# Patient Record
Sex: FEMALE | Race: BLACK OR AFRICAN AMERICAN | NOT HISPANIC OR LATINO | URBAN - METROPOLITAN AREA
[De-identification: names, ages, dates, MRNs, and addresses within clinical notes are randomized per-mention and may not be internally consistent; named-entity substitution may affect disease eponyms.]

---

## 2018-02-14 ENCOUNTER — EMERGENCY (EMERGENCY)
Facility: HOSPITAL | Age: 53
LOS: 0 days | Discharge: HOME | End: 2018-02-14
Attending: EMERGENCY MEDICINE
Payer: SELF-PAY

## 2018-02-14 VITALS
HEART RATE: 63 BPM | RESPIRATION RATE: 15 BRPM | DIASTOLIC BLOOD PRESSURE: 72 MMHG | SYSTOLIC BLOOD PRESSURE: 117 MMHG | OXYGEN SATURATION: 100 % | TEMPERATURE: 98 F | WEIGHT: 220.02 LBS | HEIGHT: 61 IN

## 2018-02-14 DIAGNOSIS — R10.33 PERIUMBILICAL PAIN: ICD-10-CM

## 2018-02-14 DIAGNOSIS — R11.2 NAUSEA WITH VOMITING, UNSPECIFIED: ICD-10-CM

## 2018-02-14 DIAGNOSIS — J45.909 UNSPECIFIED ASTHMA, UNCOMPLICATED: ICD-10-CM

## 2018-02-14 DIAGNOSIS — Z98.891 HISTORY OF UTERINE SCAR FROM PREVIOUS SURGERY: Chronic | ICD-10-CM

## 2018-02-14 LAB
ALBUMIN SERPL ELPH-MCNC: 3.8 G/DL — SIGNIFICANT CHANGE UP (ref 3.3–5)
ALP SERPL-CCNC: 107 U/L — SIGNIFICANT CHANGE UP (ref 40–120)
ALT FLD-CCNC: 17 U/L — SIGNIFICANT CHANGE UP (ref 12–78)
AMYLASE P1 CFR SERPL: 57 U/L — SIGNIFICANT CHANGE UP (ref 25–115)
ANION GAP SERPL CALC-SCNC: 7 MMOL/L — SIGNIFICANT CHANGE UP (ref 5–17)
APTT BLD: 36.1 SEC — SIGNIFICANT CHANGE UP (ref 27.5–37.4)
AST SERPL-CCNC: 11 U/L — LOW (ref 15–37)
BASOPHILS # BLD AUTO: 0.03 K/UL — SIGNIFICANT CHANGE UP (ref 0–0.2)
BASOPHILS NFR BLD AUTO: 0.2 % — SIGNIFICANT CHANGE UP (ref 0–2)
BILIRUB SERPL-MCNC: 0.3 MG/DL — SIGNIFICANT CHANGE UP (ref 0.2–1.2)
BUN SERPL-MCNC: 16 MG/DL — SIGNIFICANT CHANGE UP (ref 7–23)
CALCIUM SERPL-MCNC: 8.6 MG/DL — SIGNIFICANT CHANGE UP (ref 8.5–10.1)
CHLORIDE SERPL-SCNC: 103 MMOL/L — SIGNIFICANT CHANGE UP (ref 96–108)
CO2 SERPL-SCNC: 28 MMOL/L — SIGNIFICANT CHANGE UP (ref 22–31)
CREAT SERPL-MCNC: 0.65 MG/DL — SIGNIFICANT CHANGE UP (ref 0.5–1.3)
EOSINOPHIL # BLD AUTO: 0 K/UL — SIGNIFICANT CHANGE UP (ref 0–0.5)
EOSINOPHIL NFR BLD AUTO: 0 % — SIGNIFICANT CHANGE UP (ref 0–6)
GLUCOSE SERPL-MCNC: 122 MG/DL — HIGH (ref 70–99)
HCT VFR BLD CALC: 39.4 % — SIGNIFICANT CHANGE UP (ref 34.5–45)
HGB BLD-MCNC: 13.4 G/DL — SIGNIFICANT CHANGE UP (ref 11.5–15.5)
IMM GRANULOCYTES NFR BLD AUTO: 0.4 % — SIGNIFICANT CHANGE UP (ref 0–1.5)
INR BLD: 1.14 RATIO — SIGNIFICANT CHANGE UP (ref 0.88–1.16)
LACTATE SERPL-SCNC: 1.3 MMOL/L — SIGNIFICANT CHANGE UP (ref 0.7–2)
LIDOCAIN IGE QN: 72 U/L — LOW (ref 73–393)
LYMPHOCYTES # BLD AUTO: 1.01 K/UL — SIGNIFICANT CHANGE UP (ref 1–3.3)
LYMPHOCYTES # BLD AUTO: 7.6 % — LOW (ref 13–44)
MCHC RBC-ENTMCNC: 29.1 PG — SIGNIFICANT CHANGE UP (ref 27–34)
MCHC RBC-ENTMCNC: 34 GM/DL — SIGNIFICANT CHANGE UP (ref 32–36)
MCV RBC AUTO: 85.7 FL — SIGNIFICANT CHANGE UP (ref 80–100)
MONOCYTES # BLD AUTO: 0.24 K/UL — SIGNIFICANT CHANGE UP (ref 0–0.9)
MONOCYTES NFR BLD AUTO: 1.8 % — LOW (ref 2–14)
NEUTROPHILS # BLD AUTO: 11.92 K/UL — HIGH (ref 1.8–7.4)
NEUTROPHILS NFR BLD AUTO: 90 % — HIGH (ref 43–77)
NRBC # BLD: 0 /100 WBCS — SIGNIFICANT CHANGE UP (ref 0–0)
PLATELET # BLD AUTO: 237 K/UL — SIGNIFICANT CHANGE UP (ref 150–400)
POTASSIUM SERPL-MCNC: 3.7 MMOL/L — SIGNIFICANT CHANGE UP (ref 3.5–5.3)
POTASSIUM SERPL-SCNC: 3.7 MMOL/L — SIGNIFICANT CHANGE UP (ref 3.5–5.3)
PROT SERPL-MCNC: 8.4 GM/DL — HIGH (ref 6–8.3)
PROTHROM AB SERPL-ACNC: 12.5 SEC — SIGNIFICANT CHANGE UP (ref 9.8–12.7)
RBC # BLD: 4.6 M/UL — SIGNIFICANT CHANGE UP (ref 3.8–5.2)
RBC # FLD: 12.6 % — SIGNIFICANT CHANGE UP (ref 10.3–14.5)
SODIUM SERPL-SCNC: 138 MMOL/L — SIGNIFICANT CHANGE UP (ref 135–145)
WBC # BLD: 13.25 K/UL — HIGH (ref 3.8–10.5)
WBC # FLD AUTO: 13.25 K/UL — HIGH (ref 3.8–10.5)

## 2018-02-14 PROCEDURE — 93010 ELECTROCARDIOGRAM REPORT: CPT

## 2018-02-14 PROCEDURE — 74176 CT ABD & PELVIS W/O CONTRAST: CPT | Mod: 26

## 2018-02-14 PROCEDURE — 71045 X-RAY EXAM CHEST 1 VIEW: CPT | Mod: 26

## 2018-02-14 PROCEDURE — 99285 EMERGENCY DEPT VISIT HI MDM: CPT

## 2018-02-14 RX ORDER — IOHEXOL 300 MG/ML
30 INJECTION, SOLUTION INTRAVENOUS ONCE
Qty: 0 | Refills: 0 | Status: COMPLETED | OUTPATIENT
Start: 2018-02-14 | End: 2018-02-14

## 2018-02-14 RX ORDER — PANTOPRAZOLE SODIUM 20 MG/1
40 TABLET, DELAYED RELEASE ORAL ONCE
Qty: 0 | Refills: 0 | Status: COMPLETED | OUTPATIENT
Start: 2018-02-14 | End: 2018-02-14

## 2018-02-14 RX ORDER — ONDANSETRON 8 MG/1
4 TABLET, FILM COATED ORAL ONCE
Qty: 0 | Refills: 0 | Status: COMPLETED | OUTPATIENT
Start: 2018-02-14 | End: 2018-02-14

## 2018-02-14 RX ORDER — FAMOTIDINE 10 MG/ML
1 INJECTION INTRAVENOUS
Qty: 7 | Refills: 0
Start: 2018-02-14 | End: 2018-02-20

## 2018-02-14 RX ORDER — KETOROLAC TROMETHAMINE 30 MG/ML
30 SYRINGE (ML) INJECTION ONCE
Qty: 0 | Refills: 0 | Status: DISCONTINUED | OUTPATIENT
Start: 2018-02-14 | End: 2018-02-14

## 2018-02-14 RX ORDER — MORPHINE SULFATE 50 MG/1
4 CAPSULE, EXTENDED RELEASE ORAL ONCE
Qty: 0 | Refills: 0 | Status: DISCONTINUED | OUTPATIENT
Start: 2018-02-14 | End: 2018-02-14

## 2018-02-14 RX ORDER — SODIUM CHLORIDE 9 MG/ML
1000 INJECTION INTRAMUSCULAR; INTRAVENOUS; SUBCUTANEOUS ONCE
Qty: 0 | Refills: 0 | Status: COMPLETED | OUTPATIENT
Start: 2018-02-14 | End: 2018-02-14

## 2018-02-14 RX ADMIN — ONDANSETRON 4 MILLIGRAM(S): 8 TABLET, FILM COATED ORAL at 13:21

## 2018-02-14 RX ADMIN — Medication 30 MILLIGRAM(S): at 17:25

## 2018-02-14 RX ADMIN — IOHEXOL 30 MILLILITER(S): 300 INJECTION, SOLUTION INTRAVENOUS at 13:00

## 2018-02-14 RX ADMIN — SODIUM CHLORIDE 1000 MILLILITER(S): 9 INJECTION INTRAMUSCULAR; INTRAVENOUS; SUBCUTANEOUS at 13:21

## 2018-02-14 RX ADMIN — PANTOPRAZOLE SODIUM 40 MILLIGRAM(S): 20 TABLET, DELAYED RELEASE ORAL at 13:24

## 2018-02-14 RX ADMIN — MORPHINE SULFATE 4 MILLIGRAM(S): 50 CAPSULE, EXTENDED RELEASE ORAL at 15:57

## 2018-02-14 RX ADMIN — MORPHINE SULFATE 4 MILLIGRAM(S): 50 CAPSULE, EXTENDED RELEASE ORAL at 13:24

## 2018-02-14 NOTE — ED ADULT NURSE NOTE - CHPI ED SYMPTOMS NEG
no abdominal distension/no fever/no hematuria/no diarrhea/no chills/no blood in stool/no burning urination

## 2018-02-14 NOTE — ED PROVIDER NOTE - OBJECTIVE STATEMENT
52 years old female here c/o 10/10 mid abdominal pain vomiting since 2:00 am. Pt denies hx of recent trauma, headache, dizziness, blurred visions, light sensitivities, focal/distal weakness or numbness, cough, sob, chest pain, fever, chills, dysuria, vaginal spotting or discharge or irregular bowel movements. Pt sts her last menstrual cycle was two years ago.

## 2018-02-14 NOTE — ED PROVIDER NOTE - CONSTITUTIONAL, MLM
normal... Well appearing, well nourished, awake, alert, oriented to person, place, time/situation and in no apparent distress. Speaking in clear full sentences no nasal flaring no shoulders retractions + holding her abdomen appears uncomfortable lying flat in the stretcher

## 2018-05-14 NOTE — ED ADULT NURSE NOTE - OBJECTIVE STATEMENT
*Glaucoma Suspect Counseling:  I have explained to the patient at length glaucoma potentially causes loss of peripheral vision due to damage to the optic nerve. I have explained that damage to the optic nerve from glaucoma is irreversible and that the available treatments for glaucoma are designed to prevent further damage if we determine glaucoma is present. I have explained the importance of regular follow-up with dilated eye exams to monitor for possible progression. " since 2AM I have had stomache pain and nausea."

## 2021-10-15 ENCOUNTER — RESULT REVIEW (OUTPATIENT)
Age: 56
End: 2021-10-15

## 2021-11-01 ENCOUNTER — RESULT REVIEW (OUTPATIENT)
Age: 56
End: 2021-11-01

## 2021-11-18 PROBLEM — Z00.00 ENCOUNTER FOR PREVENTIVE HEALTH EXAMINATION: Status: ACTIVE | Noted: 2021-11-18

## 2021-11-26 PROBLEM — N85.01 COMPLEX ENDOMETRIAL HYPERPLASIA WITHOUT ATYPIA: Status: ACTIVE | Noted: 2021-11-26

## 2021-11-26 PROBLEM — Z76.89 ESTABLISHING CARE WITH NEW DOCTOR, ENCOUNTER FOR: Status: ACTIVE | Noted: 2021-11-26

## 2021-11-26 PROBLEM — D25.9 FIBROID UTERUS: Status: ACTIVE | Noted: 2021-11-26

## 2021-11-26 PROBLEM — R93.89 THICKENED ENDOMETRIUM: Status: ACTIVE | Noted: 2021-11-26

## 2021-11-26 PROBLEM — R10.2 PELVIC PAIN IN FEMALE: Status: ACTIVE | Noted: 2021-11-26

## 2021-11-29 ENCOUNTER — APPOINTMENT (OUTPATIENT)
Dept: GYNECOLOGIC ONCOLOGY | Facility: CLINIC | Age: 56
End: 2021-11-29
Payer: MEDICAID

## 2021-11-29 VITALS
HEART RATE: 62 BPM | OXYGEN SATURATION: 98 % | TEMPERATURE: 97.4 F | DIASTOLIC BLOOD PRESSURE: 73 MMHG | WEIGHT: 234 LBS | HEIGHT: 61 IN | BODY MASS INDEX: 44.18 KG/M2 | SYSTOLIC BLOOD PRESSURE: 134 MMHG

## 2021-11-29 DIAGNOSIS — D25.9 LEIOMYOMA OF UTERUS, UNSPECIFIED: ICD-10-CM

## 2021-11-29 DIAGNOSIS — N85.01 BENIGN ENDOMETRIAL HYPERPLASIA: ICD-10-CM

## 2021-11-29 DIAGNOSIS — R93.89 ABNORMAL FINDINGS ON DIAGNOSTIC IMAGING OF OTHER SPECIFIED BODY STRUCTURES: ICD-10-CM

## 2021-11-29 DIAGNOSIS — R10.2 PELVIC AND PERINEAL PAIN: ICD-10-CM

## 2021-11-29 DIAGNOSIS — Z87.09 PERSONAL HISTORY OF OTHER DISEASES OF THE RESPIRATORY SYSTEM: ICD-10-CM

## 2021-11-29 DIAGNOSIS — Z76.89 PERSONS ENCOUNTERING HEALTH SERVICES IN OTHER SPECIFIED CIRCUMSTANCES: ICD-10-CM

## 2021-11-29 DIAGNOSIS — Z78.9 OTHER SPECIFIED HEALTH STATUS: ICD-10-CM

## 2021-11-29 PROCEDURE — 99205 OFFICE O/P NEW HI 60 MIN: CPT

## 2021-11-29 RX ORDER — MELOXICAM 15 MG/1
15 TABLET ORAL
Qty: 30 | Refills: 0 | Status: ACTIVE | COMMUNITY
Start: 2021-08-06

## 2021-11-29 RX ORDER — METRONIDAZOLE 7.5 MG/G
0.75 GEL VAGINAL
Qty: 70 | Refills: 0 | Status: ACTIVE | COMMUNITY
Start: 2021-10-22

## 2021-11-29 RX ORDER — TIZANIDINE 2 MG/1
2 TABLET ORAL
Qty: 60 | Refills: 0 | Status: ACTIVE | COMMUNITY
Start: 2021-08-06

## 2021-11-29 RX ORDER — TERCONAZOLE 8 MG/G
0.8 CREAM VAGINAL
Qty: 20 | Refills: 0 | Status: ACTIVE | COMMUNITY
Start: 2021-10-15

## 2021-11-29 RX ORDER — ALBUTEROL SULFATE 90 UG/1
108 (90 BASE) INHALANT RESPIRATORY (INHALATION)
Qty: 7 | Refills: 0 | Status: ACTIVE | COMMUNITY
Start: 2021-08-06

## 2021-12-03 PROBLEM — J45.909 UNSPECIFIED ASTHMA, UNCOMPLICATED: Chronic | Status: ACTIVE | Noted: 2018-02-14

## 2021-12-06 ENCOUNTER — OUTPATIENT (OUTPATIENT)
Dept: OUTPATIENT SERVICES | Facility: HOSPITAL | Age: 56
LOS: 1 days | End: 2021-12-06

## 2021-12-06 ENCOUNTER — RESULT REVIEW (OUTPATIENT)
Age: 56
End: 2021-12-06

## 2021-12-06 DIAGNOSIS — N85.01 BENIGN ENDOMETRIAL HYPERPLASIA: ICD-10-CM

## 2021-12-06 DIAGNOSIS — Z98.891 HISTORY OF UTERINE SCAR FROM PREVIOUS SURGERY: Chronic | ICD-10-CM

## 2021-12-07 LAB — SURGICAL PATHOLOGY STUDY: SIGNIFICANT CHANGE UP

## 2021-12-29 ENCOUNTER — OUTPATIENT (OUTPATIENT)
Dept: OUTPATIENT SERVICES | Facility: HOSPITAL | Age: 56
LOS: 1 days | End: 2021-12-29
Payer: MEDICAID

## 2021-12-29 VITALS
OXYGEN SATURATION: 97 % | WEIGHT: 235.01 LBS | HEART RATE: 65 BPM | DIASTOLIC BLOOD PRESSURE: 81 MMHG | HEIGHT: 61 IN | TEMPERATURE: 96 F | SYSTOLIC BLOOD PRESSURE: 134 MMHG | RESPIRATION RATE: 18 BRPM

## 2021-12-29 DIAGNOSIS — N85.01 BENIGN ENDOMETRIAL HYPERPLASIA: ICD-10-CM

## 2021-12-29 DIAGNOSIS — J45.909 UNSPECIFIED ASTHMA, UNCOMPLICATED: ICD-10-CM

## 2021-12-29 DIAGNOSIS — Z98.890 OTHER SPECIFIED POSTPROCEDURAL STATES: Chronic | ICD-10-CM

## 2021-12-29 DIAGNOSIS — Z79.82 LONG TERM (CURRENT) USE OF ASPIRIN: ICD-10-CM

## 2021-12-29 DIAGNOSIS — Z91.89 OTHER SPECIFIED PERSONAL RISK FACTORS, NOT ELSEWHERE CLASSIFIED: ICD-10-CM

## 2021-12-29 DIAGNOSIS — Z98.891 HISTORY OF UTERINE SCAR FROM PREVIOUS SURGERY: Chronic | ICD-10-CM

## 2021-12-29 DIAGNOSIS — R06.00 DYSPNEA, UNSPECIFIED: ICD-10-CM

## 2021-12-29 DIAGNOSIS — Z01.818 ENCOUNTER FOR OTHER PREPROCEDURAL EXAMINATION: ICD-10-CM

## 2021-12-29 LAB
ANION GAP SERPL CALC-SCNC: 6 MMOL/L — SIGNIFICANT CHANGE UP (ref 5–17)
BLD GP AB SCN SERPL QL: SIGNIFICANT CHANGE UP
BUN SERPL-MCNC: 19 MG/DL — HIGH (ref 7–18)
CALCIUM SERPL-MCNC: 8.8 MG/DL — SIGNIFICANT CHANGE UP (ref 8.4–10.5)
CHLORIDE SERPL-SCNC: 108 MMOL/L — SIGNIFICANT CHANGE UP (ref 96–108)
CO2 SERPL-SCNC: 28 MMOL/L — SIGNIFICANT CHANGE UP (ref 22–31)
CREAT SERPL-MCNC: 0.7 MG/DL — SIGNIFICANT CHANGE UP (ref 0.5–1.3)
GLUCOSE SERPL-MCNC: 88 MG/DL — SIGNIFICANT CHANGE UP (ref 70–99)
HCT VFR BLD CALC: 40.8 % — SIGNIFICANT CHANGE UP (ref 34.5–45)
HGB BLD-MCNC: 13.7 G/DL — SIGNIFICANT CHANGE UP (ref 11.5–15.5)
MCHC RBC-ENTMCNC: 29.5 PG — SIGNIFICANT CHANGE UP (ref 27–34)
MCHC RBC-ENTMCNC: 33.6 GM/DL — SIGNIFICANT CHANGE UP (ref 32–36)
MCV RBC AUTO: 87.7 FL — SIGNIFICANT CHANGE UP (ref 80–100)
NRBC # BLD: 0 /100 WBCS — SIGNIFICANT CHANGE UP (ref 0–0)
PLATELET # BLD AUTO: 223 K/UL — SIGNIFICANT CHANGE UP (ref 150–400)
POTASSIUM SERPL-MCNC: 4.1 MMOL/L — SIGNIFICANT CHANGE UP (ref 3.5–5.3)
POTASSIUM SERPL-SCNC: 4.1 MMOL/L — SIGNIFICANT CHANGE UP (ref 3.5–5.3)
RBC # BLD: 4.65 M/UL — SIGNIFICANT CHANGE UP (ref 3.8–5.2)
RBC # FLD: 13.2 % — SIGNIFICANT CHANGE UP (ref 10.3–14.5)
SODIUM SERPL-SCNC: 142 MMOL/L — SIGNIFICANT CHANGE UP (ref 135–145)
WBC # BLD: 5.44 K/UL — SIGNIFICANT CHANGE UP (ref 3.8–10.5)
WBC # FLD AUTO: 5.44 K/UL — SIGNIFICANT CHANGE UP (ref 3.8–10.5)

## 2021-12-29 PROCEDURE — G0463: CPT

## 2021-12-29 NOTE — H&P PST ADULT - NSANTHOSAYNRD_GEN_A_CORE
No. LESIA screening performed.  STOP BANG Legend: 0-2 = LOW Risk; 3-4 = INTERMEDIATE Risk; 5-8 = HIGH Risk

## 2021-12-29 NOTE — H&P PST ADULT - ASSESSMENT
56 years old female with   1. Benign endometrial hyperplasia  2. Asthma  3. Aspirin use  4. Dyspnea on exertion

## 2021-12-29 NOTE — H&P PST ADULT - PROBLEM SELECTOR PLAN 5
Patient was never diagnosed with LESIA. Stop bang score is 2. Patient is low risk for LESIA, maintain leanna and post precautions.

## 2021-12-29 NOTE — H&P PST ADULT - PROBLEM SELECTOR PLAN 1
Patient is  scheduled for robotic assisted total laparoscopic hysterectomy with bilateral salpingo-oophorectomy and cystoscopy on 1/14/22. Preop instructions given including taking a shower the morning of surgery with chlorhexidine wash.

## 2021-12-29 NOTE — H&P PST ADULT - PSYCHIATRIC DETAILS
How Severe Is Your Acne?: mild Is This A New Presentation, Or A Follow-Up?: Follow Up Acne normal affect/normal behavior

## 2021-12-29 NOTE — H&P PST ADULT - HISTORY OF PRESENT ILLNESS
56 years old female with PMH of Asthma, Acid reflux presents to [facility] today for presurgical testing. Patient c/o pelvic pain x 4 months, diagnosed with benign endometrial hyperplasia and is now scheduled for robotic assisted total laparoscopic hysterectomy with bilateral salpingo-oophorectomy and cystoscopy on 1/14/22.

## 2021-12-29 NOTE — H&P PST ADULT - NEUROLOGICAL
Include Location In Plan?: No Detail Level: Detailed negative Alert & oriented; no sensory, motor or coordination deficits, normal reflexes

## 2022-01-06 ENCOUNTER — APPOINTMENT (OUTPATIENT)
Dept: GASTROENTEROLOGY | Facility: CLINIC | Age: 57
End: 2022-01-06

## 2022-01-13 ENCOUNTER — TRANSCRIPTION ENCOUNTER (OUTPATIENT)
Age: 57
End: 2022-01-13

## 2022-01-13 ENCOUNTER — NON-APPOINTMENT (OUTPATIENT)
Age: 57
End: 2022-01-13

## 2022-01-14 ENCOUNTER — RESULT REVIEW (OUTPATIENT)
Age: 57
End: 2022-01-14

## 2022-01-14 ENCOUNTER — OUTPATIENT (OUTPATIENT)
Dept: OUTPATIENT SERVICES | Facility: HOSPITAL | Age: 57
LOS: 1 days | End: 2022-01-14
Payer: MEDICAID

## 2022-01-14 ENCOUNTER — APPOINTMENT (OUTPATIENT)
Dept: GYNECOLOGIC ONCOLOGY | Facility: HOSPITAL | Age: 57
End: 2022-01-14

## 2022-01-14 VITALS
WEIGHT: 235.01 LBS | HEART RATE: 64 BPM | TEMPERATURE: 98 F | OXYGEN SATURATION: 98 % | DIASTOLIC BLOOD PRESSURE: 67 MMHG | HEIGHT: 61 IN | RESPIRATION RATE: 16 BRPM | SYSTOLIC BLOOD PRESSURE: 108 MMHG

## 2022-01-14 VITALS
OXYGEN SATURATION: 97 % | SYSTOLIC BLOOD PRESSURE: 139 MMHG | DIASTOLIC BLOOD PRESSURE: 84 MMHG | RESPIRATION RATE: 16 BRPM | HEART RATE: 73 BPM | TEMPERATURE: 98 F

## 2022-01-14 DIAGNOSIS — Z98.891 HISTORY OF UTERINE SCAR FROM PREVIOUS SURGERY: Chronic | ICD-10-CM

## 2022-01-14 DIAGNOSIS — N85.01 BENIGN ENDOMETRIAL HYPERPLASIA: ICD-10-CM

## 2022-01-14 DIAGNOSIS — Z98.890 OTHER SPECIFIED POSTPROCEDURAL STATES: Chronic | ICD-10-CM

## 2022-01-14 LAB
A1C WITH ESTIMATED AVERAGE GLUCOSE RESULT: 5.9 % — HIGH (ref 4–5.6)
BASOPHILS # BLD AUTO: 0.04 K/UL — SIGNIFICANT CHANGE UP (ref 0–0.2)
BASOPHILS NFR BLD AUTO: 0.3 % — SIGNIFICANT CHANGE UP (ref 0–2)
BLD GP AB SCN SERPL QL: SIGNIFICANT CHANGE UP
EOSINOPHIL # BLD AUTO: 0.01 K/UL — SIGNIFICANT CHANGE UP (ref 0–0.5)
EOSINOPHIL NFR BLD AUTO: 0.1 % — SIGNIFICANT CHANGE UP (ref 0–6)
ESTIMATED AVERAGE GLUCOSE: 123 MG/DL — HIGH (ref 68–114)
GLUCOSE BLDC GLUCOMTR-MCNC: 102 MG/DL — HIGH (ref 70–99)
GLUCOSE BLDC GLUCOMTR-MCNC: 149 MG/DL — HIGH (ref 70–99)
HCT VFR BLD CALC: 41 % — SIGNIFICANT CHANGE UP (ref 34.5–45)
HGB BLD-MCNC: 13.8 G/DL — SIGNIFICANT CHANGE UP (ref 11.5–15.5)
IMM GRANULOCYTES NFR BLD AUTO: 0.5 % — SIGNIFICANT CHANGE UP (ref 0–1.5)
LYMPHOCYTES # BLD AUTO: 1.28 K/UL — SIGNIFICANT CHANGE UP (ref 1–3.3)
LYMPHOCYTES # BLD AUTO: 8.7 % — LOW (ref 13–44)
MCHC RBC-ENTMCNC: 29.6 PG — SIGNIFICANT CHANGE UP (ref 27–34)
MCHC RBC-ENTMCNC: 33.7 GM/DL — SIGNIFICANT CHANGE UP (ref 32–36)
MCV RBC AUTO: 87.8 FL — SIGNIFICANT CHANGE UP (ref 80–100)
MONOCYTES # BLD AUTO: 0.52 K/UL — SIGNIFICANT CHANGE UP (ref 0–0.9)
MONOCYTES NFR BLD AUTO: 3.5 % — SIGNIFICANT CHANGE UP (ref 2–14)
NEUTROPHILS # BLD AUTO: 12.74 K/UL — HIGH (ref 1.8–7.4)
NEUTROPHILS NFR BLD AUTO: 86.9 % — HIGH (ref 43–77)
NRBC # BLD: 0 /100 WBCS — SIGNIFICANT CHANGE UP (ref 0–0)
PLATELET # BLD AUTO: 209 K/UL — SIGNIFICANT CHANGE UP (ref 150–400)
RBC # BLD: 4.67 M/UL — SIGNIFICANT CHANGE UP (ref 3.8–5.2)
RBC # FLD: 13.1 % — SIGNIFICANT CHANGE UP (ref 10.3–14.5)
WBC # BLD: 14.66 K/UL — HIGH (ref 3.8–10.5)
WBC # FLD AUTO: 14.66 K/UL — HIGH (ref 3.8–10.5)

## 2022-01-14 PROCEDURE — 88307 TISSUE EXAM BY PATHOLOGIST: CPT

## 2022-01-14 PROCEDURE — 38900 IO MAP OF SENT LYMPH NODE: CPT

## 2022-01-14 PROCEDURE — 88331 PATH CONSLTJ SURG 1 BLK 1SPC: CPT

## 2022-01-14 PROCEDURE — 88112 CYTOPATH CELL ENHANCE TECH: CPT

## 2022-01-14 PROCEDURE — 88331 PATH CONSLTJ SURG 1 BLK 1SPC: CPT | Mod: 26

## 2022-01-14 PROCEDURE — 88305 TISSUE EXAM BY PATHOLOGIST: CPT | Mod: 26

## 2022-01-14 PROCEDURE — 83036 HEMOGLOBIN GLYCOSYLATED A1C: CPT

## 2022-01-14 PROCEDURE — 88307 TISSUE EXAM BY PATHOLOGIST: CPT | Mod: 26

## 2022-01-14 PROCEDURE — 86901 BLOOD TYPING SEROLOGIC RH(D): CPT

## 2022-01-14 PROCEDURE — S2900: CPT

## 2022-01-14 PROCEDURE — 36415 COLL VENOUS BLD VENIPUNCTURE: CPT

## 2022-01-14 PROCEDURE — 38570 LAPAROSCOPY LYMPH NODE BIOP: CPT

## 2022-01-14 PROCEDURE — C1889: CPT

## 2022-01-14 PROCEDURE — 58571 TLH W/T/O 250 G OR LESS: CPT | Mod: 22

## 2022-01-14 PROCEDURE — 88112 CYTOPATH CELL ENHANCE TECH: CPT | Mod: 26

## 2022-01-14 PROCEDURE — 86850 RBC ANTIBODY SCREEN: CPT

## 2022-01-14 PROCEDURE — 85025 COMPLETE CBC W/AUTO DIFF WBC: CPT

## 2022-01-14 PROCEDURE — 86900 BLOOD TYPING SEROLOGIC ABO: CPT

## 2022-01-14 PROCEDURE — 88341 IMHCHEM/IMCYTCHM EA ADD ANTB: CPT

## 2022-01-14 PROCEDURE — 88341 IMHCHEM/IMCYTCHM EA ADD ANTB: CPT | Mod: 26

## 2022-01-14 PROCEDURE — 82962 GLUCOSE BLOOD TEST: CPT

## 2022-01-14 PROCEDURE — 88342 IMHCHEM/IMCYTCHM 1ST ANTB: CPT

## 2022-01-14 PROCEDURE — S2900 ROBOTIC SURGICAL SYSTEM: CPT | Mod: NC

## 2022-01-14 PROCEDURE — 58571 TLH W/T/O 250 G OR LESS: CPT

## 2022-01-14 PROCEDURE — 88305 TISSUE EXAM BY PATHOLOGIST: CPT

## 2022-01-14 PROCEDURE — 88342 IMHCHEM/IMCYTCHM 1ST ANTB: CPT | Mod: 26

## 2022-01-14 DEVICE — SPONGE INTERCEED 3X4": Type: IMPLANTABLE DEVICE | Status: FUNCTIONAL

## 2022-01-14 DEVICE — HEMOSTAT ARISTA 3GR: Type: IMPLANTABLE DEVICE | Status: FUNCTIONAL

## 2022-01-14 RX ORDER — SODIUM CHLORIDE 9 MG/ML
1000 INJECTION, SOLUTION INTRAVENOUS
Refills: 0 | Status: DISCONTINUED | OUTPATIENT
Start: 2022-01-14 | End: 2022-01-15

## 2022-01-14 RX ORDER — HYDROMORPHONE HYDROCHLORIDE 2 MG/ML
0.5 INJECTION INTRAMUSCULAR; INTRAVENOUS; SUBCUTANEOUS
Refills: 0 | Status: DISCONTINUED | OUTPATIENT
Start: 2022-01-14 | End: 2022-01-15

## 2022-01-14 RX ORDER — SODIUM CHLORIDE 9 MG/ML
3 INJECTION INTRAMUSCULAR; INTRAVENOUS; SUBCUTANEOUS EVERY 8 HOURS
Refills: 0 | Status: DISCONTINUED | OUTPATIENT
Start: 2022-01-14 | End: 2022-01-14

## 2022-01-14 RX ORDER — HYDROMORPHONE HYDROCHLORIDE 2 MG/ML
1 INJECTION INTRAMUSCULAR; INTRAVENOUS; SUBCUTANEOUS
Refills: 0 | Status: DISCONTINUED | OUTPATIENT
Start: 2022-01-14 | End: 2022-01-15

## 2022-01-14 RX ORDER — ONDANSETRON 8 MG/1
4 TABLET, FILM COATED ORAL ONCE
Refills: 0 | Status: DISCONTINUED | OUTPATIENT
Start: 2022-01-14 | End: 2022-01-15

## 2022-01-14 RX ORDER — CHLORHEXIDINE GLUCONATE 213 G/1000ML
1 SOLUTION TOPICAL ONCE
Refills: 0 | Status: DISCONTINUED | OUTPATIENT
Start: 2022-01-14 | End: 2022-01-14

## 2022-01-14 RX ORDER — OXYCODONE HYDROCHLORIDE 5 MG/1
1 TABLET ORAL
Qty: 12 | Refills: 0
Start: 2022-01-14

## 2022-01-14 RX ADMIN — HYDROMORPHONE HYDROCHLORIDE 0.5 MILLIGRAM(S): 2 INJECTION INTRAMUSCULAR; INTRAVENOUS; SUBCUTANEOUS at 19:00

## 2022-01-14 RX ADMIN — HYDROMORPHONE HYDROCHLORIDE 0.5 MILLIGRAM(S): 2 INJECTION INTRAMUSCULAR; INTRAVENOUS; SUBCUTANEOUS at 20:07

## 2022-01-14 RX ADMIN — HYDROMORPHONE HYDROCHLORIDE 0.5 MILLIGRAM(S): 2 INJECTION INTRAMUSCULAR; INTRAVENOUS; SUBCUTANEOUS at 20:00

## 2022-01-14 NOTE — BRIEF OPERATIVE NOTE - NSICDXBRIEFPREOP_GEN_ALL_CORE_FT
PRE-OP DIAGNOSIS:  Complex atypical endometrial hyperplasia 14-Jan-2022 16:41:51  Faviola Talavera

## 2022-01-14 NOTE — CHART NOTE - NSCHARTNOTEFT_GEN_A_CORE
Pt seen at bedside offers no complaints. Denies n/v, cp; sob; palpitations; or dizziness    T(C): 37 (01-14-22 @ 16:28), Max: 37 (01-14-22 @ 16:28)  HR: 70 (01-14-22 @ 17:28) (64 - 95)  BP: 110/54 (01-14-22 @ 17:28) (108/67 - 125/68)  RR: 14 (01-14-22 @ 17:28) (12 - 16)  SpO2: 100% (01-14-22 @ 17:28) (96% - 100%)    abd: soft/nt, dressing in place C/D/I  pelvic: no vaginal bleeding  ext: venodynes in place; no calf pain    a/p POD #0 s/p robotic assisted laparoscopic hysterectomy, bso, lysis of adhesions, cystoscopy  f/u cbc  cont close monitoring  d/w dr Reid Pt seen at bedside offers no complaints. Denies n/v, cp; sob; palpitations; or dizziness    T(C): 37 (01-14-22 @ 16:28), Max: 37 (01-14-22 @ 16:28)  HR: 70 (01-14-22 @ 17:28) (64 - 95)  BP: 110/54 (01-14-22 @ 17:28) (108/67 - 125/68)  RR: 14 (01-14-22 @ 17:28) (12 - 16)  SpO2: 100% (01-14-22 @ 17:28) (96% - 100%)    abd: soft/nt, dressing in place C/D/I  pelvic: no vaginal bleeding  ext: venodynes in place; no calf pain                          13.8   14.66 )-----------( 209      ( 14 Jan 2022 18:48 )             41.0       a/p POD #0 s/p robotic assisted laparoscopic hysterectomy, bso, lysis of adhesions, cystoscopy  pt is cleared for discharge, will follow up in office with Dr. Reid  d/c instructions verbalized  d/w dr Reid

## 2022-01-14 NOTE — BRIEF OPERATIVE NOTE - NSICDXBRIEFPOSTOP_GEN_ALL_CORE_FT
POST-OP DIAGNOSIS:  Complex atypical endometrial hyperplasia 14-Jan-2022 16:41:57  Faviola Talavera

## 2022-01-14 NOTE — ASU DISCHARGE PLAN (ADULT/PEDIATRIC) - NS MD DC FALL RISK RISK
For information on Fall & Injury Prevention, visit: [email or web address] OR  [email or web address] OR  https://www.cdc.gov/steadi/patient.html

## 2022-01-14 NOTE — BRIEF OPERATIVE NOTE - NSICDXBRIEFPROCEDURE_GEN_ALL_CORE_FT
PROCEDURES:  Hysterectomy, total, robot-assisted, laparoscopic, using da Loco Xi, with bilateral salpingo-oophorectomy and cystoscopy 14-Jan-2022 16:39:48  Faviola Talavera  Lysis of pelvic adhesions 14-Jan-2022 16:40:00  Faviola Talavera  Left ureterolysis 14-Jan-2022 16:40:08  Faviola Talavera  Robot-assisted pelvic sentinel lymph node biopsy 14-Jan-2022 16:41:37  Faviola Talavera

## 2022-01-14 NOTE — BRIEF OPERATIVE NOTE - NSICDXBRIEFOPLAUNCH_GEN_ALL_CORE
Patient called about her Losartan-hydrochlorothiazide medication.  Patient stated that it was a recall on her medication and she would like to get another substitute medication.  Please call the patient back at [phone].   <--- Click to Launch ICDx for PreOp, PostOp and Procedure

## 2022-01-14 NOTE — BRIEF OPERATIVE NOTE - OPERATION/FINDINGS
EUA: normal sized anteverted uterus, mobile, no adnexal masses palpated, no rectovaginal fullness or nodularity; normal appearing external genitalia, vagina, cervix  Laparoscopy: atraumatic laparoscopic entry, omentum adhered to anterior abdominal wall at previous  scar, normal appearing uterus with scattered small cystic structures attached to serosa, normal appearing b/l fallopian tubes and ovaries. Posterior cul-de-sac/uterosacral ligaments with evidence of endometriosis: right ovary adhered to left uterosacral ligament with filmy adhesions, sigmoid colon adhered to posterior uterus with filmy adhesions. Bladder adhered to anterior uterus at site of previous hysterotomy. Normal appearing upper anatomy (liver, gallbladder, appendix, bowel, omentum).  Cystoscopy: normal bladder contour, brisk bilateral ureteral jets visualized EUA: normal sized anteverted uterus, mobile, no adnexal masses palpated, no rectovaginal fullness or nodularity; normal appearing external genitalia, vagina, cervix  Laparoscopy: atraumatic laparoscopic entry, omentum adhered to anterior abdominal wall at previous  scar, normal appearing uterus with scattered small cystic structures attached to serosa, normal appearing b/l fallopian tubes and ovaries. Posterior cul-de-sac/uterosacral ligaments with evidence of endometriosis: right ovary adhered to left uterosacral ligament with filmy adhesions, sigmoid colon adhered to posterior uterus with filmy adhesions. Bladder adhered to anterior uterus at site of previous hysterotomy. Normal appearing upper anatomy (liver, gallbladder, appendix, bowel, omentum).  Cystoscopy: normal bladder contour, brisk bilateral ureteral jets visualized  Frozen Section: complex atypical endometrial hyperplasia

## 2022-01-14 NOTE — ASU PATIENT PROFILE, ADULT - FALL HARM RISK - UNIVERSAL INTERVENTIONS
Bed in lowest position, wheels locked, appropriate side rails in place/Call bell, personal items and telephone in reach/Instruct patient to call for assistance before getting out of bed or chair/Non-slip footwear when patient is out of bed/[place] to call system/Physically safe environment - no spills, clutter or unnecessary equipment/Purposeful Proactive Rounding/Room/bathroom lighting operational, light cord in reach

## 2022-01-14 NOTE — BRIEF OPERATIVE NOTE - COMMENTS
Hemostatic Agent(s): Crissy  Dictation #: pending Hemostatic Agent(s): Crissy  Dictation #: [id number]

## 2022-01-14 NOTE — ASU DISCHARGE PLAN (ADULT/PEDIATRIC) - PROCEDURE
Robot-Assisted Total Laparoscopic Hysterectomy, Bilateral Salpingo-oophorectomy, Lysis of Adhesions, L Ureterolysis, [place] Lymph node mapping and left sentinal lymph node dissection, Cystoscopy Robot-Assisted Total Laparoscopic Hysterectomy, Bilateral Salpingo-oophorectomy, Lysis of Adhesions, L Ureterolysis, [place] Lymph node mapping and left sentinal lymph node dissection, Cystoscopy

## 2022-01-14 NOTE — ASU DISCHARGE PLAN (ADULT/PEDIATRIC) - MEDICATION INSTRUCTIONS
Take Tylenol 975mg every 6 hours and Ibuprofen 600mg every 6 hours, alternating every 3 hours. Take Oxycodone for severe pain

## 2022-01-14 NOTE — ASU DISCHARGE PLAN (ADULT/PEDIATRIC) - CARE PROVIDER_API CALL
Sofie Reid  GYNECOLOGIC ONCOLOGY  [address] [place] [address]  Phone: [phone]  Fax: [phone]  Follow Up Time:

## 2022-01-18 ENCOUNTER — NON-APPOINTMENT (OUTPATIENT)
Age: 57
End: 2022-01-18

## 2022-01-19 ENCOUNTER — NON-APPOINTMENT (OUTPATIENT)
Age: 57
End: 2022-01-19

## 2022-01-19 PROBLEM — K21.9 GASTRO-ESOPHAGEAL REFLUX DISEASE WITHOUT ESOPHAGITIS: Chronic | Status: ACTIVE | Noted: 2021-12-29

## 2022-01-20 LAB — SURGICAL PATHOLOGY STUDY: SIGNIFICANT CHANGE UP

## 2022-01-21 LAB — NON-GYNECOLOGICAL CYTOLOGY STUDY: SIGNIFICANT CHANGE UP

## 2022-02-07 ENCOUNTER — APPOINTMENT (OUTPATIENT)
Dept: GYNECOLOGIC ONCOLOGY | Facility: CLINIC | Age: 57
End: 2022-02-07
Payer: MEDICAID

## 2022-02-07 VITALS
WEIGHT: 236 LBS | BODY MASS INDEX: 44.56 KG/M2 | OXYGEN SATURATION: 98 % | HEART RATE: 66 BPM | HEIGHT: 61 IN | TEMPERATURE: 97.3 F | DIASTOLIC BLOOD PRESSURE: 76 MMHG | SYSTOLIC BLOOD PRESSURE: 124 MMHG

## 2022-02-07 PROCEDURE — 99024 POSTOP FOLLOW-UP VISIT: CPT

## 2022-03-10 ENCOUNTER — OUTPATIENT (OUTPATIENT)
Dept: OUTPATIENT SERVICES | Facility: HOSPITAL | Age: 57
LOS: 1 days | End: 2022-03-10
Payer: MEDICAID

## 2022-03-10 ENCOUNTER — APPOINTMENT (OUTPATIENT)
Dept: GYNECOLOGIC ONCOLOGY | Facility: CLINIC | Age: 57
End: 2022-03-10
Payer: MEDICAID

## 2022-03-10 ENCOUNTER — APPOINTMENT (OUTPATIENT)
Dept: ULTRASOUND IMAGING | Facility: HOSPITAL | Age: 57
End: 2022-03-10
Payer: MEDICAID

## 2022-03-10 ENCOUNTER — APPOINTMENT (OUTPATIENT)
Dept: ULTRASOUND IMAGING | Facility: HOSPITAL | Age: 57
End: 2022-03-10

## 2022-03-10 VITALS
SYSTOLIC BLOOD PRESSURE: 154 MMHG | HEART RATE: 67 BPM | HEIGHT: 61 IN | BODY MASS INDEX: 43.23 KG/M2 | DIASTOLIC BLOOD PRESSURE: 82 MMHG | WEIGHT: 229 LBS

## 2022-03-10 DIAGNOSIS — Z98.891 HISTORY OF UTERINE SCAR FROM PREVIOUS SURGERY: Chronic | ICD-10-CM

## 2022-03-10 DIAGNOSIS — R39.9 UNSPECIFIED SYMPTOMS AND SIGNS INVOLVING THE GENITOURINARY SYSTEM: ICD-10-CM

## 2022-03-10 DIAGNOSIS — R10.2 PELVIC AND PERINEAL PAIN: ICD-10-CM

## 2022-03-10 DIAGNOSIS — R10.9 UNSPECIFIED ABDOMINAL PAIN: ICD-10-CM

## 2022-03-10 DIAGNOSIS — Z98.890 OTHER SPECIFIED POSTPROCEDURAL STATES: Chronic | ICD-10-CM

## 2022-03-10 PROCEDURE — 76700 US EXAM ABDOM COMPLETE: CPT | Mod: 26

## 2022-03-10 PROCEDURE — 99024 POSTOP FOLLOW-UP VISIT: CPT

## 2022-03-10 PROCEDURE — 76830 TRANSVAGINAL US NON-OB: CPT

## 2022-03-10 PROCEDURE — 76830 TRANSVAGINAL US NON-OB: CPT | Mod: 26

## 2022-03-10 PROCEDURE — 76856 US EXAM PELVIC COMPLETE: CPT | Mod: 26,59

## 2022-03-10 PROCEDURE — 76856 US EXAM PELVIC COMPLETE: CPT | Mod: 26

## 2022-03-10 PROCEDURE — 76856 US EXAM PELVIC COMPLETE: CPT

## 2022-03-10 PROCEDURE — 76700 US EXAM ABDOM COMPLETE: CPT

## 2022-03-11 ENCOUNTER — NON-APPOINTMENT (OUTPATIENT)
Age: 57
End: 2022-03-11

## 2022-03-15 ENCOUNTER — NON-APPOINTMENT (OUTPATIENT)
Age: 57
End: 2022-03-15

## 2022-03-21 ENCOUNTER — APPOINTMENT (OUTPATIENT)
Dept: GYNECOLOGIC ONCOLOGY | Facility: CLINIC | Age: 57
End: 2022-03-21
Payer: MEDICAID

## 2022-03-21 VITALS
BODY MASS INDEX: 42.1 KG/M2 | TEMPERATURE: 97.3 F | HEART RATE: 62 BPM | DIASTOLIC BLOOD PRESSURE: 75 MMHG | SYSTOLIC BLOOD PRESSURE: 115 MMHG | WEIGHT: 223 LBS | OXYGEN SATURATION: 97 % | HEIGHT: 61 IN

## 2022-03-21 DIAGNOSIS — R10.2 PELVIC AND PERINEAL PAIN: ICD-10-CM

## 2022-03-21 DIAGNOSIS — R30.0 DYSURIA: ICD-10-CM

## 2022-03-21 DIAGNOSIS — Z48.89 ENCOUNTER FOR OTHER SPECIFIED SURGICAL AFTERCARE: ICD-10-CM

## 2022-03-21 DIAGNOSIS — R10.9 UNSPECIFIED ABDOMINAL PAIN: ICD-10-CM

## 2022-03-21 LAB
APPEARANCE: CLEAR
BACTERIA UR CULT: NORMAL
BACTERIA: NEGATIVE
BILIRUBIN URINE: NEGATIVE
BLOOD URINE: NEGATIVE
COLOR: NORMAL
GLUCOSE QUALITATIVE U: NEGATIVE
HYALINE CASTS: 0 /LPF
KETONES URINE: NEGATIVE
LEUKOCYTE ESTERASE URINE: NEGATIVE
MICROSCOPIC-UA: NORMAL
NITRITE URINE: NEGATIVE
PH URINE: 6.5
PROTEIN URINE: NEGATIVE
RED BLOOD CELLS URINE: 1 /HPF
SPECIFIC GRAVITY URINE: 1.01
SQUAMOUS EPITHELIAL CELLS: 1 /HPF
UROBILINOGEN URINE: NORMAL
WHITE BLOOD CELLS URINE: 0 /HPF

## 2022-03-21 PROCEDURE — 99024 POSTOP FOLLOW-UP VISIT: CPT

## 2022-03-23 ENCOUNTER — NON-APPOINTMENT (OUTPATIENT)
Age: 57
End: 2022-03-23

## 2022-03-29 ENCOUNTER — RESULT REVIEW (OUTPATIENT)
Age: 57
End: 2022-03-29

## 2022-03-29 ENCOUNTER — APPOINTMENT (OUTPATIENT)
Dept: CT IMAGING | Facility: HOSPITAL | Age: 57
End: 2022-03-29
Payer: MEDICAID

## 2022-03-29 ENCOUNTER — OUTPATIENT (OUTPATIENT)
Dept: OUTPATIENT SERVICES | Facility: HOSPITAL | Age: 57
LOS: 1 days | End: 2022-03-29
Payer: MEDICAID

## 2022-03-29 DIAGNOSIS — R10.2 PELVIC AND PERINEAL PAIN: ICD-10-CM

## 2022-03-29 DIAGNOSIS — Z98.890 OTHER SPECIFIED POSTPROCEDURAL STATES: Chronic | ICD-10-CM

## 2022-03-29 DIAGNOSIS — R10.9 UNSPECIFIED ABDOMINAL PAIN: ICD-10-CM

## 2022-03-29 DIAGNOSIS — Z98.891 HISTORY OF UTERINE SCAR FROM PREVIOUS SURGERY: Chronic | ICD-10-CM

## 2022-03-29 PROCEDURE — 74177 CT ABD & PELVIS W/CONTRAST: CPT | Mod: 26

## 2022-03-29 PROCEDURE — 74177 CT ABD & PELVIS W/CONTRAST: CPT

## 2022-04-05 ENCOUNTER — NON-APPOINTMENT (OUTPATIENT)
Age: 57
End: 2022-04-05

## 2022-04-14 ENCOUNTER — APPOINTMENT (OUTPATIENT)
Dept: SURGERY | Facility: CLINIC | Age: 57
End: 2022-04-14
Payer: MEDICAID

## 2022-04-14 VITALS
HEART RATE: 63 BPM | SYSTOLIC BLOOD PRESSURE: 132 MMHG | WEIGHT: 229 LBS | DIASTOLIC BLOOD PRESSURE: 81 MMHG | HEIGHT: 61 IN | BODY MASS INDEX: 43.23 KG/M2

## 2022-04-14 VITALS — TEMPERATURE: 97.3 F

## 2022-04-14 DIAGNOSIS — E78.00 PURE HYPERCHOLESTEROLEMIA, UNSPECIFIED: ICD-10-CM

## 2022-04-14 DIAGNOSIS — J45.909 UNSPECIFIED ASTHMA, UNCOMPLICATED: ICD-10-CM

## 2022-04-14 DIAGNOSIS — Z78.9 OTHER SPECIFIED HEALTH STATUS: ICD-10-CM

## 2022-04-14 PROCEDURE — 99203 OFFICE O/P NEW LOW 30 MIN: CPT

## 2022-04-14 RX ORDER — ASPIRIN 325 MG/1
TABLET, FILM COATED ORAL
Refills: 0 | Status: ACTIVE | COMMUNITY

## 2022-04-14 RX ORDER — OMEPRAZOLE 20 MG/1
TABLET, DELAYED RELEASE ORAL
Refills: 0 | Status: ACTIVE | COMMUNITY

## 2022-04-14 NOTE — REVIEW OF SYSTEMS
[Abdominal Pain] : abdominal pain [Fever] : no fever [Chills] : no chills [Feeling Poorly] : not feeling poorly [Chest Pain] : no chest pain [Lower Ext Edema] : no lower extremity edema [Shortness Of Breath] : no shortness of breath [Skin Lesions] : no skin lesions [Skin Wound] : no skin wound [Itching] : no itching [Dizziness] : no dizziness [Anxiety] : no anxiety [Muscle Weakness] : no muscle weakness [Swollen Glands] : no swollen glands

## 2022-04-14 NOTE — HISTORY OF PRESENT ILLNESS
[de-identified] : ARSH BELTRÁN is a 56 year old F who is referred to the office for consultation visit, by her gyn oncologist, Dr. Reid. Patient presents w the cc of feeling a lot of pain to the lower abdominal pain, over the past few months. She has history of 2 C-Sections and S/P total abdominal hysterectomy and bilateral salpingo-oophorectomy. \par Patient had pelvic US 03/10/22 and CT of abdomen/pelvis, 03/29/22. \par Patient states her symptoms had started prior to the surgery, and still have not resolved.

## 2022-04-14 NOTE — PHYSICAL EXAM
[Normal Breath Sounds] : Normal breath sounds [Normal Rate and Rhythm] : normal rate and rhythm [No Rash or Lesion] : No rash or lesion [Alert] : alert [Oriented to Person] : oriented to person [Oriented to Place] : oriented to place [Oriented to Time] : oriented to time [Calm] : calm [de-identified] : A/Ox3; NAD. appears comfortable [de-identified] : EOMI; sclera anicteric. Nasal mucosa pink, septum midline. Oral mucosa pink. Tongue midline, Pharynx without exudates. [de-identified] : abd is obese, soft, ND; \par mild bulge to the lower abdomen; no defect noted, large pannus \par diffuse suprapubic tenderness  [de-identified] : +ROM, no joint swelling

## 2022-04-14 NOTE — ASSESSMENT
[FreeTextEntry1] : Ms. BELTRÁN is a 56 year y/o F who presents for evaluation for hernia, c/o suprapubic tenderness for several months. US Pelvis/CT of Abdomen and Pelvis, reviewed. On exam, unable to appreciate defect /hernia to explain patients symptoms, which are out of proportion to PE findings.

## 2022-04-22 ENCOUNTER — NON-APPOINTMENT (OUTPATIENT)
Age: 57
End: 2022-04-22

## 2022-04-28 ENCOUNTER — APPOINTMENT (OUTPATIENT)
Dept: SURGERY | Facility: CLINIC | Age: 57
End: 2022-04-28
Payer: MEDICAID

## 2022-04-28 VITALS
HEIGHT: 61 IN | DIASTOLIC BLOOD PRESSURE: 83 MMHG | SYSTOLIC BLOOD PRESSURE: 138 MMHG | WEIGHT: 229 LBS | BODY MASS INDEX: 43.23 KG/M2 | HEART RATE: 71 BPM

## 2022-04-28 DIAGNOSIS — K43.2 INCISIONAL HERNIA W/OUT OBSTRUCTION OR GANGRENE: ICD-10-CM

## 2022-04-28 DIAGNOSIS — Z01.818 ENCOUNTER FOR OTHER PREPROCEDURAL EXAMINATION: ICD-10-CM

## 2022-04-28 PROCEDURE — 99213 OFFICE O/P EST LOW 20 MIN: CPT

## 2022-04-28 NOTE — PLAN
[FreeTextEntry1] : Ms. ARSH BELTRÁN  was informed of significance of findings. All options, risks and benefits were discussed at length. She is having a lot of discomfort to the area, which is out of proportion to physical exam findings, which was discussed w the patient. Discussed w the patient that pain may not fully resolve after hernia repair. \par \par Informed consent for repair of incisional hernia and the potential post op complications were discussed, including infection, recurrence. Other risks including use of mesh, bowel complication, infected mesh and other related complications were also discussed at length.\par The patient wishes to proceed with the planned surgery. We will schedule for surgery at the next available date, pending any required insurance pre-certification or pre-approval. Patient agrees to obtain any necessary pre-operative evaluations and testing prior to surgery.\par She was advised to seek immediate medical attention with any acute change in symptoms or with the development of any new or worsening symptoms. \par Patient's questions and concerns addressed to patient's satisfaction, and patient verbalized an understanding of the information discussed.\par \par Will schedule for the surgery at [facility] [place]. \par \par

## 2022-04-28 NOTE — PHYSICAL EXAM
[No Rash or Lesion] : No rash or lesion [Alert] : alert [Oriented to Person] : oriented to person [Oriented to Place] : oriented to place [Oriented to Time] : oriented to time [Calm] : calm [de-identified] : A/Ox3; NAD. appears comfortable [de-identified] : EOMI; sclera anicteric.  [de-identified] : airway patent  [de-identified] : normal HR [de-identified] : abd is obese, soft, ND; \par bulge to the lower abdomen; large pannus \par incisional hernia \par diffuse suprapubic tenderness  [de-identified] : +ROM, no joint swelling

## 2022-04-28 NOTE — REVIEW OF SYSTEMS
[Fever] : no fever [Chills] : no chills [Feeling Poorly] : not feeling poorly [Chest Pain] : no chest pain [Lower Ext Edema] : no lower extremity edema [Shortness Of Breath] : no shortness of breath [Cough] : no cough [Skin Lesions] : no skin lesions [Skin Wound] : no skin wound [Confused] : no confusion [Dizziness] : no dizziness [FreeTextEntry7] : lower abdominal discomfort

## 2022-04-28 NOTE — ASSESSMENT
[FreeTextEntry1] : Ms. BELTRÁN is a 56 year y/o F who presents w cc of feeling discomfort to the lower abdomen. On exam, she was found to have a large pannus, and likely has incisional hernia. \par CT/imaging from 03/29/22 reviewed which had showed a defect.

## 2022-04-28 NOTE — CONSULT LETTER
[Dear  ___] : Dear  [unfilled], [Consult Letter:] : I had the pleasure of evaluating your patient, [unfilled]. [Consult Closing:] : Thank you very much for allowing me to participate in the care of this patient.  If you have any questions, please do not hesitate to contact me. [Sincerely,] : Sincerely, [FreeTextEntry3] : Tutu Michel MD\par

## 2022-04-28 NOTE — HISTORY OF PRESENT ILLNESS
[de-identified] : ARSH BELTRÁN is a 56 year old F presents to the office for follow up visit, referred by her gyn oncologist, Dr. Reid. Patient presents w the cc of feeling a lot of pain to the lower abdominal pain, over the past few months. She has history of 2 C-Sections and S/P total abdominal hysterectomy and bilateral salpingo-oophorectomy. \par Patient had pelvic US 03/10/22 and CT of abdomen/pelvis, 03/29/22. \par

## 2022-06-02 ENCOUNTER — OUTPATIENT (OUTPATIENT)
Dept: OUTPATIENT SERVICES | Facility: HOSPITAL | Age: 57
LOS: 1 days | End: 2022-06-02
Payer: MEDICAID

## 2022-06-02 VITALS
WEIGHT: 231.93 LBS | TEMPERATURE: 98 F | DIASTOLIC BLOOD PRESSURE: 82 MMHG | HEIGHT: 62 IN | RESPIRATION RATE: 16 BRPM | HEART RATE: 62 BPM | OXYGEN SATURATION: 99 % | SYSTOLIC BLOOD PRESSURE: 141 MMHG

## 2022-06-02 DIAGNOSIS — K43.2 INCISIONAL HERNIA WITHOUT OBSTRUCTION OR GANGRENE: ICD-10-CM

## 2022-06-02 DIAGNOSIS — Z90.710 ACQUIRED ABSENCE OF BOTH CERVIX AND UTERUS: Chronic | ICD-10-CM

## 2022-06-02 DIAGNOSIS — K08.89 OTHER SPECIFIED DISORDERS OF TEETH AND SUPPORTING STRUCTURES: ICD-10-CM

## 2022-06-02 DIAGNOSIS — K43.0 INCISIONAL HERNIA WITH OBSTRUCTION, WITHOUT GANGRENE: ICD-10-CM

## 2022-06-02 DIAGNOSIS — J45.909 UNSPECIFIED ASTHMA, UNCOMPLICATED: ICD-10-CM

## 2022-06-02 DIAGNOSIS — Z01.818 ENCOUNTER FOR OTHER PREPROCEDURAL EXAMINATION: ICD-10-CM

## 2022-06-02 DIAGNOSIS — Z98.891 HISTORY OF UTERINE SCAR FROM PREVIOUS SURGERY: Chronic | ICD-10-CM

## 2022-06-02 DIAGNOSIS — Z98.890 OTHER SPECIFIED POSTPROCEDURAL STATES: Chronic | ICD-10-CM

## 2022-06-02 LAB
A1C WITH ESTIMATED AVERAGE GLUCOSE RESULT: 5.8 % — HIGH (ref 4–5.6)
ANION GAP SERPL CALC-SCNC: 5 MMOL/L — SIGNIFICANT CHANGE UP (ref 5–17)
BUN SERPL-MCNC: 20 MG/DL — HIGH (ref 7–18)
CALCIUM SERPL-MCNC: 9.3 MG/DL — SIGNIFICANT CHANGE UP (ref 8.4–10.5)
CHLORIDE SERPL-SCNC: 107 MMOL/L — SIGNIFICANT CHANGE UP (ref 96–108)
CO2 SERPL-SCNC: 26 MMOL/L — SIGNIFICANT CHANGE UP (ref 22–31)
CREAT SERPL-MCNC: 0.71 MG/DL — SIGNIFICANT CHANGE UP (ref 0.5–1.3)
EGFR: 100 ML/MIN/1.73M2 — SIGNIFICANT CHANGE UP
ESTIMATED AVERAGE GLUCOSE: 120 MG/DL — HIGH (ref 68–114)
GLUCOSE SERPL-MCNC: 95 MG/DL — SIGNIFICANT CHANGE UP (ref 70–99)
HCT VFR BLD CALC: 41.1 % — SIGNIFICANT CHANGE UP (ref 34.5–45)
HGB BLD-MCNC: 13.8 G/DL — SIGNIFICANT CHANGE UP (ref 11.5–15.5)
MCHC RBC-ENTMCNC: 29.2 PG — SIGNIFICANT CHANGE UP (ref 27–34)
MCHC RBC-ENTMCNC: 33.6 GM/DL — SIGNIFICANT CHANGE UP (ref 32–36)
MCV RBC AUTO: 87.1 FL — SIGNIFICANT CHANGE UP (ref 80–100)
NRBC # BLD: 0 /100 WBCS — SIGNIFICANT CHANGE UP (ref 0–0)
PLATELET # BLD AUTO: 172 K/UL — SIGNIFICANT CHANGE UP (ref 150–400)
POTASSIUM SERPL-MCNC: 4.6 MMOL/L — SIGNIFICANT CHANGE UP (ref 3.5–5.3)
POTASSIUM SERPL-SCNC: 4.6 MMOL/L — SIGNIFICANT CHANGE UP (ref 3.5–5.3)
RBC # BLD: 4.72 M/UL — SIGNIFICANT CHANGE UP (ref 3.8–5.2)
RBC # FLD: 13 % — SIGNIFICANT CHANGE UP (ref 10.3–14.5)
SODIUM SERPL-SCNC: 138 MMOL/L — SIGNIFICANT CHANGE UP (ref 135–145)
WBC # BLD: 5.9 K/UL — SIGNIFICANT CHANGE UP (ref 3.8–10.5)
WBC # FLD AUTO: 5.9 K/UL — SIGNIFICANT CHANGE UP (ref 3.8–10.5)

## 2022-06-02 PROCEDURE — 80048 BASIC METABOLIC PNL TOTAL CA: CPT

## 2022-06-02 PROCEDURE — G0463: CPT

## 2022-06-02 PROCEDURE — 36415 COLL VENOUS BLD VENIPUNCTURE: CPT

## 2022-06-02 PROCEDURE — 85027 COMPLETE CBC AUTOMATED: CPT

## 2022-06-02 PROCEDURE — 83036 HEMOGLOBIN GLYCOSYLATED A1C: CPT

## 2022-06-02 RX ORDER — SODIUM CHLORIDE 9 MG/ML
3 INJECTION INTRAMUSCULAR; INTRAVENOUS; SUBCUTANEOUS EVERY 8 HOURS
Refills: 0 | Status: DISCONTINUED | OUTPATIENT
Start: 2022-06-29 | End: 2022-07-13

## 2022-06-02 NOTE — H&P PST ADULT - NSICDXPASTSURGICALHX_GEN_ALL_CORE_FT
PAST SURGICAL HISTORY:  H/O:      H/O: hysterectomy 2022    S/P arthroscopy of right shoulder 2018

## 2022-06-02 NOTE — H&P PST ADULT - PROBLEM SELECTOR PLAN 2
Instructed to use albuterol inhaler as prescribed and bring medication to hospital the day of surgery  Follow up with PCP postoperatively for management of asthma

## 2022-06-02 NOTE — H&P PST ADULT - PROBLEM SELECTOR PLAN 1
Scheduled for incisional hernia repair, possible mesh 6/14/2022  Preoperative instructions discussed and given to patient.   NPO after midnight the day before surgery  Take medications with a sip of water the morning of surgery as discussed  Instructed to schedule and complete COVID 19 test 3 days prior to surgery.   Discussed preprocedural skin preparation using chlorhexidine gluconate 4% solution three days prior to  surgery.  Instructed patient to avoid aspirin and aspirin products, over the counter medications such as vitamins and herbal medications, one week prior to surgery.  Take Tylenol as needed for pain  Patient verbalized understanding of instructions  Labs here today  EKG/CXR/MC by PCP - pending

## 2022-06-02 NOTE — H&P PST ADULT - PROBLEM SELECTOR PLAN 3
States she had hysterectomy 1/2021 - with loose tooth  Discussed the possible risks of aspiration with loose tooth  Instructed patient to have loose tooth extracted prior to surgery  Patient verbalized understanding of instructions and plans to have loose tooth extracted prior to surgery

## 2022-06-02 NOTE — H&P PST ADULT - HISTORY OF PRESENT ILLNESS
56 years old female with PMH of Asthma, Acid reflux presents to [facility] today for presurgical testing. She had robotic assisted total laparoscopic hysterectomy with bilateral salpingo-oophorectomy and cystoscopy on 1/14/22, and is now diagnosed with   Patient is scheduled for incisional hernia repair, possible mesh 6/14/2022 56 years old female with PMH of Uncomplicated Asthma (last exacerbation 2018 - Albuterol HFA, prn), Acid reflux omeprazole), presents to [facility] for presurgical testing. She had robotic assisted total laparoscopic hysterectomy with bilateral salpingo-oophorectomy and cystoscopy on 1/14/22, and is now diagnosed with incisional hernia without obstruction or gangrene.  She is scheduled for incisional hernia repair, possible mesh 6/14/2022

## 2022-06-02 NOTE — H&P PST ADULT - ASSESSMENT
56 years old female with PMH of Uncomplicated Asthma (last exacerbation 2018 - Albuterol HFA, prn), Acid reflux omeprazole), loose left lower wisdom tooth is diagnosed with incisional hernia without obstruction or gangrene  STOP BANG Score is 2 56 years old female (BMI 42.4) with PMH of Uncomplicated Asthma (last exacerbation 2018 - Albuterol HFA, prn), Acid reflux omeprazole), loose left lower wisdom tooth is diagnosed with incisional hernia without obstruction or gangrene  STOP BANG Score is 2

## 2022-06-27 LAB — SARS-COV-2 N GENE NPH QL NAA+PROBE: NOT DETECTED

## 2022-06-28 ENCOUNTER — TRANSCRIPTION ENCOUNTER (OUTPATIENT)
Age: 57
End: 2022-06-28

## 2022-06-29 ENCOUNTER — TRANSCRIPTION ENCOUNTER (OUTPATIENT)
Age: 57
End: 2022-06-29

## 2022-06-29 ENCOUNTER — RESULT REVIEW (OUTPATIENT)
Age: 57
End: 2022-06-29

## 2022-06-29 ENCOUNTER — OUTPATIENT (OUTPATIENT)
Dept: OUTPATIENT SERVICES | Facility: HOSPITAL | Age: 57
LOS: 1 days | End: 2022-06-29
Payer: MEDICAID

## 2022-06-29 ENCOUNTER — APPOINTMENT (OUTPATIENT)
Dept: SURGERY | Facility: HOSPITAL | Age: 57
End: 2022-06-29

## 2022-06-29 VITALS
HEART RATE: 65 BPM | TEMPERATURE: 98 F | OXYGEN SATURATION: 95 % | DIASTOLIC BLOOD PRESSURE: 70 MMHG | SYSTOLIC BLOOD PRESSURE: 126 MMHG | RESPIRATION RATE: 16 BRPM

## 2022-06-29 VITALS
OXYGEN SATURATION: 97 % | RESPIRATION RATE: 16 BRPM | SYSTOLIC BLOOD PRESSURE: 111 MMHG | HEART RATE: 60 BPM | DIASTOLIC BLOOD PRESSURE: 76 MMHG | HEIGHT: 61 IN | TEMPERATURE: 98 F | WEIGHT: 231.93 LBS

## 2022-06-29 DIAGNOSIS — Z90.710 ACQUIRED ABSENCE OF BOTH CERVIX AND UTERUS: Chronic | ICD-10-CM

## 2022-06-29 DIAGNOSIS — Z98.891 HISTORY OF UTERINE SCAR FROM PREVIOUS SURGERY: Chronic | ICD-10-CM

## 2022-06-29 DIAGNOSIS — Z98.890 OTHER SPECIFIED POSTPROCEDURAL STATES: Chronic | ICD-10-CM

## 2022-06-29 DIAGNOSIS — K43.2 INCISIONAL HERNIA WITHOUT OBSTRUCTION OR GANGRENE: ICD-10-CM

## 2022-06-29 PROCEDURE — 88305 TISSUE EXAM BY PATHOLOGIST: CPT

## 2022-06-29 PROCEDURE — 22900 EXC ABDL TUM DEEP < 5 CM: CPT | Mod: AS

## 2022-06-29 PROCEDURE — 49560: CPT | Mod: AS

## 2022-06-29 PROCEDURE — 22900 EXC ABDL TUM DEEP < 5 CM: CPT

## 2022-06-29 PROCEDURE — 49560: CPT

## 2022-06-29 PROCEDURE — 88305 TISSUE EXAM BY PATHOLOGIST: CPT | Mod: 26

## 2022-06-29 DEVICE — MESH HERNIA VENTRALEX 6.4CM MED: Type: IMPLANTABLE DEVICE | Status: FUNCTIONAL

## 2022-06-29 RX ORDER — FENTANYL CITRATE 50 UG/ML
50 INJECTION INTRAVENOUS
Refills: 0 | Status: DISCONTINUED | OUTPATIENT
Start: 2022-06-29 | End: 2022-06-29

## 2022-06-29 RX ORDER — ONDANSETRON 8 MG/1
4 TABLET, FILM COATED ORAL ONCE
Refills: 0 | Status: DISCONTINUED | OUTPATIENT
Start: 2022-06-29 | End: 2022-06-29

## 2022-06-29 RX ORDER — FENTANYL CITRATE 50 UG/ML
25 INJECTION INTRAVENOUS
Refills: 0 | Status: DISCONTINUED | OUTPATIENT
Start: 2022-06-29 | End: 2022-06-29

## 2022-06-29 RX ORDER — OXYCODONE AND ACETAMINOPHEN 5; 325 MG/1; MG/1
1 TABLET ORAL EVERY 4 HOURS
Refills: 0 | Status: DISCONTINUED | OUTPATIENT
Start: 2022-06-29 | End: 2022-06-29

## 2022-06-29 NOTE — BRIEF OPERATIVE NOTE - NSICDXBRIEFPROCEDURE_GEN_ALL_CORE_FT
PROCEDURES:  Open repair of incisional hernia using suture 29-Jun-2022 11:20:29  Gerardo Salazar  Excisional biopsy, mass, abdominal wall 29-Jun-2022 11:23:04  Gerardo Salazar

## 2022-06-29 NOTE — BRIEF OPERATIVE NOTE - OPERATION/FINDINGS
mass, hernia mass, hernia  The mass was 3 cm, extending subfascial , fused and inseparable from the fascia.

## 2022-06-29 NOTE — ASU DISCHARGE PLAN (ADULT/PEDIATRIC) - NS MD DC FALL RISK RISK
For information on Fall & Injury Prevention, visit: [email or web address] OR  [email or web address] OR  https://www.cdc.gov/steadi/patient.html

## 2022-06-29 NOTE — ASU DISCHARGE PLAN (ADULT/PEDIATRIC) - CARE PROVIDER_API CALL
Tutu Michel (MD)  Surgery  [address] [place], [place] [id number]  Phone: [phone]  Fax: [phone]  Follow Up Time:

## 2022-06-29 NOTE — BRIEF OPERATIVE NOTE - NSICDXBRIEFPOSTOP_GEN_ALL_CORE_FT
POST-OP DIAGNOSIS:  Irreducible incisional hernia 29-Jun-2022 11:24:40  Gerardo Salazar  Mass of anterior abdominal wall 29-Jun-2022 11:24:51  Gerardo Salazar

## 2022-06-29 NOTE — BRIEF OPERATIVE NOTE - NSICDXBRIEFPREOP_GEN_ALL_CORE_FT
PRE-OP DIAGNOSIS:  Incisional hernia, incarcerated 29-Jun-2022 11:24:07  Gerardo Salazar  Mass of anterior abdominal wall 29-Jun-2022 11:24:22  Gerardo Salazar

## 2022-06-29 NOTE — ASU PREOP CHECKLIST - IV STARTED
by RN Winsome/yes unsuccessful by RN Winsome/Lashon due to "difficult stick"--to be done by anesthesiologist in OR (as per protocol)--OR made aware./yes

## 2022-07-06 LAB — SURGICAL PATHOLOGY STUDY: SIGNIFICANT CHANGE UP

## 2022-07-11 ENCOUNTER — APPOINTMENT (OUTPATIENT)
Dept: SURGERY | Facility: CLINIC | Age: 57
End: 2022-07-11

## 2022-07-11 PROCEDURE — 99024 POSTOP FOLLOW-UP VISIT: CPT

## 2022-07-11 NOTE — REASON FOR VISIT
[Post Op: _________] : a [unfilled] post op visit [FreeTextEntry1] : S/P Repair of lower abdominal incisional hernia and excision of  lower abdominal wall mass 06/29/22

## 2022-07-11 NOTE — ASSESSMENT
[FreeTextEntry1] : ARSH BELTRÁN presents to the office for postoperative visit today, she is S/P Repair of lower abdominal incisional hernia and excision of  lower abdominal wall mass 06/29/22. \par \par Incision sites healing well and as expected. There is no evidence of infection/complication, and patient is progressing as expected. Post-operative wound care, activities, restrictions and precautions were reinforced. Pathology results were discussed in detail. Patient's questions and concerns addressed to patient's satisfaction.\par

## 2022-07-11 NOTE — PHYSICAL EXAM
[No Rash or Lesion] : No rash or lesion [Alert] : alert [Oriented to Person] : oriented to person [Oriented to Place] : oriented to place [Oriented to Time] : oriented to time [Calm] : calm [de-identified] : A/Ox3; NAD. appears comfortable [de-identified] : abd soft NT ND\par well healing incisions, no evidence of acute inflammation or infection

## 2022-07-11 NOTE — HISTORY OF PRESENT ILLNESS
[de-identified] : ARSH BELTRÁN presents to the office for postoperative visit today, she is S/P Repair of lower abdominal incisional hernia and excision of  lower abdominal wall mass 06/29/22. Today, patient without reported complaints, stating she's feeling slightly better. No fevers/chills.

## 2022-10-10 ENCOUNTER — TRANSCRIPTION ENCOUNTER (OUTPATIENT)
Age: 57
End: 2022-10-10

## 2022-10-11 ENCOUNTER — RESULT REVIEW (OUTPATIENT)
Age: 57
End: 2022-10-11

## 2022-10-11 ENCOUNTER — INPATIENT (INPATIENT)
Facility: HOSPITAL | Age: 57
LOS: 0 days | Discharge: HOME | End: 2022-10-11
Attending: SURGERY | Admitting: SURGERY

## 2022-10-11 ENCOUNTER — TRANSCRIPTION ENCOUNTER (OUTPATIENT)
Age: 57
End: 2022-10-11

## 2022-10-11 VITALS
DIASTOLIC BLOOD PRESSURE: 71 MMHG | TEMPERATURE: 98 F | RESPIRATION RATE: 18 BRPM | OXYGEN SATURATION: 99 % | HEIGHT: 61 IN | HEART RATE: 57 BPM | SYSTOLIC BLOOD PRESSURE: 104 MMHG

## 2022-10-11 VITALS
RESPIRATION RATE: 20 BRPM | HEART RATE: 82 BPM | DIASTOLIC BLOOD PRESSURE: 70 MMHG | TEMPERATURE: 97 F | SYSTOLIC BLOOD PRESSURE: 118 MMHG | OXYGEN SATURATION: 97 %

## 2022-10-11 DIAGNOSIS — Z98.891 HISTORY OF UTERINE SCAR FROM PREVIOUS SURGERY: Chronic | ICD-10-CM

## 2022-10-11 DIAGNOSIS — K35.80 UNSPECIFIED ACUTE APPENDICITIS: ICD-10-CM

## 2022-10-11 DIAGNOSIS — Z98.890 OTHER SPECIFIED POSTPROCEDURAL STATES: Chronic | ICD-10-CM

## 2022-10-11 DIAGNOSIS — Z90.710 ACQUIRED ABSENCE OF BOTH CERVIX AND UTERUS: Chronic | ICD-10-CM

## 2022-10-11 LAB
ALBUMIN SERPL ELPH-MCNC: 3.3 G/DL — SIGNIFICANT CHANGE UP (ref 3.3–5)
ALP SERPL-CCNC: 114 U/L — SIGNIFICANT CHANGE UP (ref 40–120)
ALT FLD-CCNC: 21 U/L — SIGNIFICANT CHANGE UP (ref 12–78)
ANION GAP SERPL CALC-SCNC: 6 MMOL/L — SIGNIFICANT CHANGE UP (ref 5–17)
AST SERPL-CCNC: 22 U/L — SIGNIFICANT CHANGE UP (ref 15–37)
BASOPHILS # BLD AUTO: 0.02 K/UL — SIGNIFICANT CHANGE UP (ref 0–0.2)
BASOPHILS NFR BLD AUTO: 0.1 % — SIGNIFICANT CHANGE UP (ref 0–2)
BILIRUB SERPL-MCNC: 0.3 MG/DL — SIGNIFICANT CHANGE UP (ref 0.2–1.2)
BLD GP AB SCN SERPL QL: SIGNIFICANT CHANGE UP
BUN SERPL-MCNC: 17 MG/DL — SIGNIFICANT CHANGE UP (ref 7–23)
CALCIUM SERPL-MCNC: 8.8 MG/DL — SIGNIFICANT CHANGE UP (ref 8.5–10.1)
CHLORIDE SERPL-SCNC: 108 MMOL/L — SIGNIFICANT CHANGE UP (ref 96–108)
CO2 SERPL-SCNC: 25 MMOL/L — SIGNIFICANT CHANGE UP (ref 22–31)
CREAT SERPL-MCNC: 0.7 MG/DL — SIGNIFICANT CHANGE UP (ref 0.5–1.3)
EGFR: 101 ML/MIN/1.73M2 — SIGNIFICANT CHANGE UP
EOSINOPHIL # BLD AUTO: 0.01 K/UL — SIGNIFICANT CHANGE UP (ref 0–0.5)
EOSINOPHIL NFR BLD AUTO: 0.1 % — SIGNIFICANT CHANGE UP (ref 0–6)
FLUAV AG NPH QL: SIGNIFICANT CHANGE UP
FLUBV AG NPH QL: SIGNIFICANT CHANGE UP
GLUCOSE SERPL-MCNC: 138 MG/DL — HIGH (ref 70–99)
HCT VFR BLD CALC: 41.8 % — SIGNIFICANT CHANGE UP (ref 34.5–45)
HGB BLD-MCNC: 13.6 G/DL — SIGNIFICANT CHANGE UP (ref 11.5–15.5)
IMM GRANULOCYTES NFR BLD AUTO: 0.5 % — SIGNIFICANT CHANGE UP (ref 0–0.9)
LACTATE SERPL-SCNC: 1.8 MMOL/L — SIGNIFICANT CHANGE UP (ref 0.7–2)
LIDOCAIN IGE QN: 82 U/L — SIGNIFICANT CHANGE UP (ref 73–393)
LYMPHOCYTES # BLD AUTO: 1.4 K/UL — SIGNIFICANT CHANGE UP (ref 1–3.3)
LYMPHOCYTES # BLD AUTO: 9.9 % — LOW (ref 13–44)
MCHC RBC-ENTMCNC: 28.9 PG — SIGNIFICANT CHANGE UP (ref 27–34)
MCHC RBC-ENTMCNC: 32.5 G/DL — SIGNIFICANT CHANGE UP (ref 32–36)
MCV RBC AUTO: 88.9 FL — SIGNIFICANT CHANGE UP (ref 80–100)
MONOCYTES # BLD AUTO: 0.48 K/UL — SIGNIFICANT CHANGE UP (ref 0–0.9)
MONOCYTES NFR BLD AUTO: 3.4 % — SIGNIFICANT CHANGE UP (ref 2–14)
NEUTROPHILS # BLD AUTO: 12.17 K/UL — HIGH (ref 1.8–7.4)
NEUTROPHILS NFR BLD AUTO: 86 % — HIGH (ref 43–77)
NRBC # BLD: 0 /100 WBCS — SIGNIFICANT CHANGE UP (ref 0–0)
PLATELET # BLD AUTO: 215 K/UL — SIGNIFICANT CHANGE UP (ref 150–400)
POTASSIUM SERPL-MCNC: 4 MMOL/L — SIGNIFICANT CHANGE UP (ref 3.5–5.3)
POTASSIUM SERPL-SCNC: 4 MMOL/L — SIGNIFICANT CHANGE UP (ref 3.5–5.3)
PROT SERPL-MCNC: 7.8 GM/DL — SIGNIFICANT CHANGE UP (ref 6–8.3)
RBC # BLD: 4.7 M/UL — SIGNIFICANT CHANGE UP (ref 3.8–5.2)
RBC # FLD: 12.8 % — SIGNIFICANT CHANGE UP (ref 10.3–14.5)
SARS-COV-2 RNA SPEC QL NAA+PROBE: SIGNIFICANT CHANGE UP
SODIUM SERPL-SCNC: 139 MMOL/L — SIGNIFICANT CHANGE UP (ref 135–145)
TROPONIN I, HIGH SENSITIVITY RESULT: 41.2 NG/L — SIGNIFICANT CHANGE UP
WBC # BLD: 14.15 K/UL — HIGH (ref 3.8–10.5)
WBC # FLD AUTO: 14.15 K/UL — HIGH (ref 3.8–10.5)

## 2022-10-11 PROCEDURE — 74177 CT ABD & PELVIS W/CONTRAST: CPT | Mod: 26,MC

## 2022-10-11 PROCEDURE — 99285 EMERGENCY DEPT VISIT HI MDM: CPT

## 2022-10-11 PROCEDURE — 71045 X-RAY EXAM CHEST 1 VIEW: CPT | Mod: 26

## 2022-10-11 PROCEDURE — 44970 LAPAROSCOPY APPENDECTOMY: CPT | Mod: AS

## 2022-10-11 PROCEDURE — 44970 LAPAROSCOPY APPENDECTOMY: CPT

## 2022-10-11 PROCEDURE — 88304 TISSUE EXAM BY PATHOLOGIST: CPT | Mod: 26

## 2022-10-11 DEVICE — CLIP APPLIER COVIDIEN ENDOCLIP III 5MM: Type: IMPLANTABLE DEVICE | Status: FUNCTIONAL

## 2022-10-11 DEVICE — STAPLER COVIDIEN TRI-STAPLE 45MM TAN RELOAD: Type: IMPLANTABLE DEVICE | Status: FUNCTIONAL

## 2022-10-11 RX ORDER — FAMOTIDINE 10 MG/ML
20 INJECTION INTRAVENOUS ONCE
Refills: 0 | Status: COMPLETED | OUTPATIENT
Start: 2022-10-11 | End: 2022-10-11

## 2022-10-11 RX ORDER — OXYCODONE HYDROCHLORIDE 5 MG/1
5 TABLET ORAL EVERY 4 HOURS
Refills: 0 | Status: DISCONTINUED | OUTPATIENT
Start: 2022-10-11 | End: 2022-10-11

## 2022-10-11 RX ORDER — HEPARIN SODIUM 5000 [USP'U]/ML
5000 INJECTION INTRAVENOUS; SUBCUTANEOUS EVERY 8 HOURS
Refills: 0 | Status: DISCONTINUED | OUTPATIENT
Start: 2022-10-11 | End: 2022-10-11

## 2022-10-11 RX ORDER — ONDANSETRON 8 MG/1
4 TABLET, FILM COATED ORAL ONCE
Refills: 0 | Status: COMPLETED | OUTPATIENT
Start: 2022-10-11 | End: 2022-10-11

## 2022-10-11 RX ORDER — PIPERACILLIN AND TAZOBACTAM 4; .5 G/20ML; G/20ML
3.38 INJECTION, POWDER, LYOPHILIZED, FOR SOLUTION INTRAVENOUS ONCE
Refills: 0 | Status: COMPLETED | OUTPATIENT
Start: 2022-10-11 | End: 2022-10-11

## 2022-10-11 RX ORDER — HYDROMORPHONE HYDROCHLORIDE 2 MG/ML
0.5 INJECTION INTRAMUSCULAR; INTRAVENOUS; SUBCUTANEOUS
Refills: 0 | Status: DISCONTINUED | OUTPATIENT
Start: 2022-10-11 | End: 2022-10-11

## 2022-10-11 RX ORDER — OXYCODONE HYDROCHLORIDE 5 MG/1
1 TABLET ORAL
Qty: 12 | Refills: 0
Start: 2022-10-11

## 2022-10-11 RX ORDER — ONDANSETRON 8 MG/1
4 TABLET, FILM COATED ORAL EVERY 6 HOURS
Refills: 0 | Status: DISCONTINUED | OUTPATIENT
Start: 2022-10-11 | End: 2022-10-11

## 2022-10-11 RX ORDER — SODIUM CHLORIDE 9 MG/ML
1000 INJECTION, SOLUTION INTRAVENOUS
Refills: 0 | Status: DISCONTINUED | OUTPATIENT
Start: 2022-10-11 | End: 2022-10-11

## 2022-10-11 RX ORDER — ONDANSETRON 8 MG/1
4 TABLET, FILM COATED ORAL ONCE
Refills: 0 | Status: DISCONTINUED | OUTPATIENT
Start: 2022-10-11 | End: 2022-10-11

## 2022-10-11 RX ORDER — MORPHINE SULFATE 50 MG/1
4 CAPSULE, EXTENDED RELEASE ORAL ONCE
Refills: 0 | Status: DISCONTINUED | OUTPATIENT
Start: 2022-10-11 | End: 2022-10-11

## 2022-10-11 RX ORDER — SODIUM CHLORIDE 9 MG/ML
1000 INJECTION INTRAMUSCULAR; INTRAVENOUS; SUBCUTANEOUS ONCE
Refills: 0 | Status: COMPLETED | OUTPATIENT
Start: 2022-10-11 | End: 2022-10-11

## 2022-10-11 RX ORDER — HYDROMORPHONE HYDROCHLORIDE 2 MG/ML
1 INJECTION INTRAMUSCULAR; INTRAVENOUS; SUBCUTANEOUS
Refills: 0 | Status: DISCONTINUED | OUTPATIENT
Start: 2022-10-11 | End: 2022-10-11

## 2022-10-11 RX ADMIN — HEPARIN SODIUM 5000 UNIT(S): 5000 INJECTION INTRAVENOUS; SUBCUTANEOUS at 14:08

## 2022-10-11 RX ADMIN — SODIUM CHLORIDE 1000 MILLILITER(S): 9 INJECTION INTRAMUSCULAR; INTRAVENOUS; SUBCUTANEOUS at 13:48

## 2022-10-11 RX ADMIN — SODIUM CHLORIDE 1000 MILLILITER(S): 9 INJECTION INTRAMUSCULAR; INTRAVENOUS; SUBCUTANEOUS at 10:17

## 2022-10-11 RX ADMIN — ONDANSETRON 4 MILLIGRAM(S): 8 TABLET, FILM COATED ORAL at 10:17

## 2022-10-11 RX ADMIN — SODIUM CHLORIDE 100 MILLILITER(S): 9 INJECTION, SOLUTION INTRAVENOUS at 16:38

## 2022-10-11 RX ADMIN — FAMOTIDINE 20 MILLIGRAM(S): 10 INJECTION INTRAVENOUS at 10:17

## 2022-10-11 RX ADMIN — PIPERACILLIN AND TAZOBACTAM 200 GRAM(S): 4; .5 INJECTION, POWDER, LYOPHILIZED, FOR SOLUTION INTRAVENOUS at 14:07

## 2022-10-11 RX ADMIN — MORPHINE SULFATE 4 MILLIGRAM(S): 50 CAPSULE, EXTENDED RELEASE ORAL at 10:16

## 2022-10-11 NOTE — ED PROVIDER NOTE - PHYSICAL EXAMINATION
GEN: Awake, alert, interactive, NAD.  HEAD AND NECK: NC/AT. Airway patent. Neck supple.   EYES:  Clear b/l. EOMI. PERRL.   ENT: Moist mucus membranes.   CARDIAC: Regular rate, regular rhythm. No evident pedal edema.    RESP/CHEST: Normal respiratory effort with no use of accessory muscles or retractions. Clear throughout on auscultation.  ABD: Soft, non-distended, + TTP epigastrium. No rebound, no guarding.   BACK: No midline spinal TTP. No CVAT.   EXTREMITIES: Moving all extremities with no apparent deformities.   SKIN: Warm, dry, intact normal color. No rash.   NEURO: AOx3, CN II-XII grossly intact, no focal deficits.   PSYCH: Appropriate mood and affect.

## 2022-10-11 NOTE — BRIEF OPERATIVE NOTE - NSICDXBRIEFPREOP_GEN_ALL_CORE_FT
PRE-OP DIAGNOSIS:  Acute appendicitis with localized peritonitis 11-Oct-2022 16:19:56  Chucho Mesa

## 2022-10-11 NOTE — H&P ADULT - ASSESSMENT
Acute appendicitis visualized on CT  56y/o female with PMH of Asthma, PSH of Hysterectomy and Incisional hernia repair presenting to ED with Abdominal pain found to have Acute Appendicitis  As discussed with Dr. Franks, admit  NPO/IVF/ IV ABX  OR for Lap Appy  Pain management  DVT PPX

## 2022-10-11 NOTE — BRIEF OPERATIVE NOTE - NSEVIDNCEINFORABSCESSFT_GEN_ALL_CORE
making act with supervision  Long term goals  Time Frame for Long term goals : 2 weeks  Long term goal 1: complete overall toileting with modified independence  Long term goal 2: complete overall dressing with modifed independence  Long term goal 3: verbalize DME  Long term goal 4: complete HEP with independence  Long term goal 5: complete overall bathing with supervision  Patient Goals   Patient goals : return home       Therapy Time   Individual Concurrent Group Co-treatment   Time In 1400         Time Out 1445         Minutes 45         Timed Code Treatment Minutes: 45 Minutes       Electronically signed by Genevieve Carrera OT on 6/15/2018 at 6:18 PM Leukocytosis

## 2022-10-11 NOTE — H&P ADULT - NS ATTEND AMEND GEN_ALL_CORE FT
Patient seen and examined  Labs and imaging reviewed  Acute appendicitis  Risks, benefits, and alternatives discussed  OR for laparoscopic appendectomy

## 2022-10-11 NOTE — BRIEF OPERATIVE NOTE - NSICDXBRIEFPOSTOP_GEN_ALL_CORE_FT
POST-OP DIAGNOSIS:  Acute appendicitis with localized peritonitis 11-Oct-2022 16:20:10  Chucho Mesa

## 2022-10-11 NOTE — DISCHARGE NOTE PROVIDER - CARE PROVIDER_API CALL
Ld Franks)  Surgery  [address] [facility]  [place] [address]  Phone: [phone]  Fax: [phone]  Follow Up Time:

## 2022-10-11 NOTE — ED PROVIDER NOTE - PROGRESS NOTE DETAILS
+ appy on CT, surg will admit CT w/ + acute appendicitis, + leukocytosis to 14. Surgery consulted, will admit to their service. Pt updated to results, admission. She understands / agrees w/ this plan.

## 2022-10-11 NOTE — ED ADULT TRIAGE NOTE - CHIEF COMPLAINT QUOTE
pt c/o generalized abdominal pain nausea and vomiting since yesterday. history of hernia repair 2 months ago. last BM was yesterday.

## 2022-10-11 NOTE — ED PROVIDER NOTE - OBJECTIVE STATEMENT
57F PMH asthma, GERD pw upper abd pain a/w N/V since yesterday. Pt reports 2mo of lower abd pain s/p hernia repair surgery, unchanged / constant. Pt reports upper abd pain is new / different. Denies F/C, CP, SOB, cough, flank pain, diarrhea, constipation, black or bloody BMs, UTI sx, LE pain / swelling. Pt states last BM was yesterday, normal. Denies new or spoiled foods, recent travel, sick contacts.     PMH as above, PSH hysterectomy, hernia repair x2, NKDA, meds as listed.

## 2022-10-11 NOTE — DISCHARGE NOTE PROVIDER - HOSPITAL COURSE
Patient was admitted to [facility] for abdominal pain. Patient underwent Laparoscopic Appendectomy   . The patient was admitted to the surgical floor for observation and pain management. The remainder of the hospital stay was uneventful and patient was stable for discharge.

## 2022-10-11 NOTE — ED ADULT NURSE NOTE - OBJECTIVE STATEMENT
pt is A&0x4, non labored breathing on room air, pt is vomiting , pt appears to be in pain, as per pt, "I had pain all over my stomach for the past two days and I started vomiting today", pt abdomin is soft and tender, pt's abdomin is obese and tender to palpation , MD is aware

## 2022-10-11 NOTE — ED ADULT NURSE NOTE - NS ED NURSE REPORT GIVEN TO FT
March 21, 2022     Patient: Daniel Laurent   YOB: 2009   Date of Visit: 3/21/2022       To Whom it May Concern:    Daniel Laurent is under my professional care  He was seen in my office on 3/21/2022  He should not return to gym class or sports until cleared by a physician  Please allow the child to take Tylenol or Motrin as directed on the bottle when needed  Please provide for extra time between classes if necessary  Please provide for any assistance with carrying books or writing if necessary  Please provide for an elevator pass if necessary  If you have any questions or concerns, please don't hesitate to call           Sincerely,          Edgar Johnston MD        CC: No Recipients
JOHANNE Adams

## 2022-10-11 NOTE — DISCHARGE NOTE NURSING/CASE MANAGEMENT/SOCIAL WORK - PATIENT PORTAL LINK FT
You can access the FollowMyHealth Patient Portal offered by [facility] by registering at the following website: [email or web address]. By joining [employer]’s FollowMyHealth portal, you will also be able to view your health information using other applications (apps) compatible with our system.

## 2022-10-11 NOTE — H&P ADULT - NSHPLABSRESULTS_GEN_ALL_CORE
Labs 10/11/22  WBC 14.15  Neutrophil # 12.17  Neutrophil % 86    CT 10/11/22  IMPRESSION: Fluid distended appendix with trace adjacent stranding,   compatible with acute appendicitis. No evidence of extraluminal gas or   drainable collection. Labs 10/11/22  WBC 14.15  Neutrophil # 12.17  Neutrophil % 86    CT 10/11/22< from: CT Abdomen and Pelvis w/ IV Cont (10.11.22 @ 11:49) >  FINDINGS:  LOWER CHEST: 3 mm subpleural nodule in the left lower lobe on image 9 and   stable 3 mm subpleural nodule on image 17.    LIVER: Within normal limits.  BILE DUCTS: Normal caliber.  GALLBLADDER: Within normal limits.  SPLEEN: Within normal limits. Small accessory spleens.  PANCREAS: Within normal limits.  ADRENALS: Within normal limits.  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   Otherwise, within normal limits.    BLADDER: Within normal limits.  REPRODUCTIVE ORGANS: The uterus and ovaries have been removed.    BOWEL: The appendix is fluid distended to 15 mm with an 8 mm   appendicolith at the base. There is trace periappendiceal stranding.   There is no extraluminal gas or drainable fluid collection.    Remaining small and large bowel loops are unremarkable with no evidence   of obstruction or inflammatory stranding of the mesenteric fat. There is   no mesenteric adenopathy.  PERITONEUM: No ascites.  VESSELS: Within normal limits.  RETROPERITONEUM/LYMPH NODES: No lymphadenopathy.  ABDOMINAL WALL: Status post ventral pelvic hernia repair.  BONES: Within normal limits.    IMPRESSION: Fluid distended appendix with trace adjacent stranding,   compatible with acute appendicitis. No evidence of extraluminal gas or   drainable collection.

## 2022-10-11 NOTE — H&P ADULT - NSHPREVIEWOFSYSTEMS_GEN_ALL_CORE
(+): nausea, vomiting (7x since yesterday)    (-): fever, chills, chest pain, SOB, lightheadedness, H/A, constipation/diarrhea, blood in the stool, hematuria/dysuria/polyuria. (+): nausea, vomiting (7x since yesterday)    (-): fever, chills, chest pain, SOB, lightheadedness, H/A, constipation/diarrhea, blood in the stool, hematuria/dysuria/polyuria, extremity swelling.

## 2022-10-11 NOTE — H&P ADULT - HISTORY OF PRESENT ILLNESS
58 y/o female with PMH of hernia repair (unknown date) and GERD presents to ED for new onset, 10/10, stabbing, non-radiating epigastric pain; onset at 10pm last night with no known event of onset. Denies anything making the pain better or worse; denies change in pain with movement or sitting still. Pt tried Tylenol (500mg, extra strength) last night with no improvement. Pt also admits to RLQ/LLQ pain but states this pain has been chronic for about a year s/p hernia repair (unknown date, "last year"). Pt states n/v with 5 episodes of vomiting last night and 2 episodes today; described as "clear". States last BM last night; "normal". Denies fever, chills, chest pain, SOB, lightheadedness, H/A, constipation/diarrhea, blood in the stool, hematuria/dysuria/polyuria.  58 y/o female with PMH of asthma (PRN albuterol), acid reflux (omeprazole), hernia repair (Jan 22) presents to ED for new onset, 10/10, stabbing, non-radiating epigastric pain; onset at 10pm last night with no known event of onset. Denies anything making the pain better or worse; denies change in pain with movement or sitting still. Pt tried Tylenol (500mg, extra strength) last night with no improvement. Pt also admits to RLQ/LLQ pain but states this pain has been chronic for about a year s/p hernia repair (unknown date, "last year"). Pt states n/v with 5 episodes of vomiting last night and 2 episodes today; described as "clear". States last BM last night; "normal". Denies fever, chills, weight changes, chest pain, SOB, lightheadedness, H/A, constipation/diarrhea, blood in the stool, hematuria/dysuria/polyuria, extremity swelling.  58 y/o female with PMH of asthma (PRN albuterol), acid reflux (omeprazole), hernia repair (Jan 22) presents to ED for new onset, 10/10, stabbing, non-radiating epigastric pain; onset at 10pm last night with no known event of onset. Denies anything making the pain better or worse; denies change in pain with movement or sitting still. Pt tried Tylenol (500mg, extra strength) last night with no improvement. Pt also admits to RLQ/LLQ pain but states this pain has been chronic for about a year s/p hernia repair (Jan 22). Pt states n/v with 5 episodes of vomiting last night and 2 episodes today; described as "clear". States last BM last night; "normal". Denies fever, chills, weight changes, chest pain, SOB, lightheadedness, H/A, constipation/diarrhea, blood in the stool, hematuria/dysuria/polyuria, extremity swelling.  58 y/o female with PMH of asthma (PRN albuterol), acid reflux (omeprazole), hernia repair (Jan 22) presents to ED for sudden onset, 7/10, stabbing, non-radiating epigastric pain; onset at 10pm last night, pain is progressively getting worse. Denies any aggravating or alleviating factors. Pt tried Tylenol (500mg, extra strength) last night with no improvement. Pt also admits to RLQ/LLQ pain but states this pain has been chronic for about a year s/p hernia repair (Jan 22). Pt states n/v with 5 episodes of NBNB emesis and 2 episodes today. States last BM last night; "normal". Denies fever, chills, weight changes, chest pain, SOB, lightheadedness, H/A, constipation/diarrhea, blood in the stool, hematuria/dysuria/polyuria, extremity swelling.

## 2022-10-11 NOTE — H&P ADULT - NSHPSOCIALHISTORY_GEN_ALL_CORE
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 Denies smoking, alcohol or any other recreation drug use

## 2022-10-11 NOTE — DISCHARGE NOTE PROVIDER - NSDCCPCAREPLAN_GEN_ALL_CORE_FT
PRINCIPAL DISCHARGE DIAGNOSIS  Diagnosis: Acute appendicitis  Assessment and Plan of Treatment: Please no heavy lifting  bending twisting pushing or  pulling ,  Shower in 2 days with wound clean dry and waterproof , no work or driving.Take OTC stool softeners as needed for constipation.  Diet as tolerated. Call the doctor if any questions or concerns. Return to ER for any emergencies.

## 2022-10-11 NOTE — ED PROVIDER NOTE - CLINICAL SUMMARY MEDICAL DECISION MAKING FREE TEXT BOX
57F w/ PMH asthma, GERD pw upper abd pain a/w N/V since yesterday. AF, VSS. Pt appears uncomfortable, + TTP epigastrium. Plan: CBC, CMP, lipase, lactate, UA/C, coags, T&S, CT AP, Flu/COVID. Give IVF, Pepcid, Morphine, Zofran. Re-eval.

## 2022-10-11 NOTE — DISCHARGE NOTE PROVIDER - NSDCMRMEDTOKEN_GEN_ALL_CORE_FT
albuterol 0.63 mg/3 mL (0.021%) inhalation solution: 3 milliliter(s) inhaled 3 times a day  Aspir 81 oral delayed release tablet: 1 tab(s) orally once a day  omeprazole 20 mg oral delayed release capsule: 1 cap(s) orally once a day  oxyCODONE 5 mg oral tablet: 1 tab(s) orally every 6 hours, As Needed -Moderate Pain (4 - 6) MDD:4

## 2022-10-11 NOTE — H&P ADULT - NSHPPHYSICALEXAM_GEN_ALL_CORE
General: mild distress 2 to pain, no respiratory distress/ increased effort, non-diaphoretic/not ill-appearing  Skin: no rashes, masses, lesions, erythema, increased temperature  Neck: no ALEX  Respiratory: non-labored breathing/no respiratory distress; normal rate, CTAB, no rales/wheezing/rhonchi General: mild distress 2 to pain, no respiratory distress/ increased effort, non-diaphoretic/not ill-appearing  Skin: no rashes, masses, lesions, erythema, increased temperature  Neck: no ALEX  Respiratory: non-labored breathing/no respiratory distress; normal rate, CTAB, no rales/wheezing/rhonchi  Cardiac: normal rate/rhythm, non-tender to palpation   Abdominal: Soft, non-distended abdomen. Normoactive BS x4. Tenderness on palpation in the epigastric, RLQ, and LLQ. (+) McBurney's point tenderness, (+)Rosvig, (+)Obturator/Psoas signs. No CVAT. GEN: Awake, alert, interactive, NAD.  HEAD AND NECK: NC/AT. Airway patent. Neck supple.   EYES:  Clear b/l. EOMI. PERRL.   ENT: Moist mucus membranes.   CARDIAC: Regular rate, regular rhythm. No evident pedal edema.    RESP/CHEST: non-labored breathing/no respiratory distress; normal rate, CTAB, no rales/wheezing/rhonchi  ABD: Soft, non-distended abdomen. Normoactive BS x4. Tenderness on palpation in the epigastric, RLQ, and LLQ. (+) McBurney's point tenderness, (+)Rosvig, (+)Obturator/Psoas signs. No CVAT.  BACK: No midline spinal TTP. No CVAT.   EXTREMITIES: Moving all extremities with no apparent deformities.   SKIN: Warm, dry, intact normal color. No rash.   NEURO: AOx3, CN II-XII grossly intact, no focal deficits.   PSYCH: Appropriate mood and affect.

## 2022-10-11 NOTE — DISCHARGE NOTE NURSING/CASE MANAGEMENT/SOCIAL WORK - NSDCPEFALRISK_GEN_ALL_CORE
For information on Fall & Injury Prevention, visit: [email or web address] OR  [email or web address] OR  https://www.cdc.gov/steadi/patient.html

## 2022-10-13 LAB — SURGICAL PATHOLOGY STUDY: SIGNIFICANT CHANGE UP

## 2022-10-16 DIAGNOSIS — K21.9 GASTRO-ESOPHAGEAL REFLUX DISEASE WITHOUT ESOPHAGITIS: ICD-10-CM

## 2022-10-16 DIAGNOSIS — K35.30 ACUTE APPENDICITIS WITH LOCALIZED PERITONITIS, WITHOUT PERFORATION OR GANGRENE: ICD-10-CM

## 2022-10-16 DIAGNOSIS — Z79.82 LONG TERM (CURRENT) USE OF ASPIRIN: ICD-10-CM

## 2022-10-16 DIAGNOSIS — Z91.040 LATEX ALLERGY STATUS: ICD-10-CM

## 2022-10-16 DIAGNOSIS — K35.890 OTHER ACUTE APPENDICITIS WITHOUT PERFORATION OR GANGRENE: ICD-10-CM

## 2022-10-16 DIAGNOSIS — Z90.710 ACQUIRED ABSENCE OF BOTH CERVIX AND UTERUS: ICD-10-CM

## 2022-10-16 DIAGNOSIS — Z79.899 OTHER LONG TERM (CURRENT) DRUG THERAPY: ICD-10-CM

## 2022-10-16 DIAGNOSIS — J45.909 UNSPECIFIED ASTHMA, UNCOMPLICATED: ICD-10-CM

## 2022-10-26 ENCOUNTER — APPOINTMENT (OUTPATIENT)
Dept: BARIATRICS | Facility: CLINIC | Age: 57
End: 2022-10-26

## 2022-10-26 VITALS
WEIGHT: 230.06 LBS | SYSTOLIC BLOOD PRESSURE: 123 MMHG | HEIGHT: 61 IN | HEART RATE: 60 BPM | TEMPERATURE: 98.1 F | BODY MASS INDEX: 43.43 KG/M2 | DIASTOLIC BLOOD PRESSURE: 80 MMHG | OXYGEN SATURATION: 95 %

## 2022-10-26 PROCEDURE — 99024 POSTOP FOLLOW-UP VISIT: CPT

## 2022-10-26 NOTE — HISTORY OF PRESENT ILLNESS
[de-identified] : Patient is a 56 y/o woman with a h/o appendicitis s/p laparoscopic appendectomy on 10/11/22.  Tolerating PO, pain controlled, no compaints

## 2023-02-03 NOTE — PATIENT PROFILE ADULT - OVER THE PAST TWO WEEKS HAVE YOU FELT DOWN, DEPRESSED OR HOPELESS?
[FreeTextEntry1] : 9/12/2022: The patient is a 25 year old male presenting for a follow-up evaluation of his right foot, Lisfranc injury. He is wearing sneakers and is walking without assistance. When barefoot he reports increased pain with weightbearing stated to be ongoing for the past several months. He denies any pain when wearing sneakers. The patient is status post right foot Lisfranc rupture sustained on 12/28/2021 and treated conservatively. No other complaints. 
no
agitation, threatening to aide

## 2023-04-27 ENCOUNTER — APPOINTMENT (OUTPATIENT)
Dept: GASTROENTEROLOGY | Facility: CLINIC | Age: 58
End: 2023-04-27

## 2023-08-27 ENCOUNTER — EMERGENCY (EMERGENCY)
Facility: HOSPITAL | Age: 58
LOS: 0 days | Discharge: HOME | End: 2023-08-27
Attending: STUDENT IN AN ORGANIZED HEALTH CARE EDUCATION/TRAINING PROGRAM
Payer: COMMERCIAL

## 2023-08-27 VITALS
DIASTOLIC BLOOD PRESSURE: 83 MMHG | TEMPERATURE: 98 F | HEART RATE: 81 BPM | WEIGHT: 222.01 LBS | RESPIRATION RATE: 17 BRPM | HEIGHT: 61 IN | OXYGEN SATURATION: 96 % | SYSTOLIC BLOOD PRESSURE: 156 MMHG

## 2023-08-27 VITALS
SYSTOLIC BLOOD PRESSURE: 115 MMHG | DIASTOLIC BLOOD PRESSURE: 74 MMHG | OXYGEN SATURATION: 97 % | TEMPERATURE: 98 F | HEART RATE: 66 BPM | RESPIRATION RATE: 18 BRPM

## 2023-08-27 DIAGNOSIS — Z98.891 HISTORY OF UTERINE SCAR FROM PREVIOUS SURGERY: Chronic | ICD-10-CM

## 2023-08-27 DIAGNOSIS — Z87.440 PERSONAL HISTORY OF URINARY (TRACT) INFECTIONS: ICD-10-CM

## 2023-08-27 DIAGNOSIS — R10.30 LOWER ABDOMINAL PAIN, UNSPECIFIED: ICD-10-CM

## 2023-08-27 DIAGNOSIS — B37.31 ACUTE CANDIDIASIS OF VULVA AND VAGINA: ICD-10-CM

## 2023-08-27 DIAGNOSIS — Z90.710 ACQUIRED ABSENCE OF BOTH CERVIX AND UTERUS: ICD-10-CM

## 2023-08-27 DIAGNOSIS — Z98.890 OTHER SPECIFIED POSTPROCEDURAL STATES: Chronic | ICD-10-CM

## 2023-08-27 DIAGNOSIS — Z90.710 ACQUIRED ABSENCE OF BOTH CERVIX AND UTERUS: Chronic | ICD-10-CM

## 2023-08-27 DIAGNOSIS — R20.8 OTHER DISTURBANCES OF SKIN SENSATION: ICD-10-CM

## 2023-08-27 DIAGNOSIS — R30.0 DYSURIA: ICD-10-CM

## 2023-08-27 LAB
APPEARANCE UR: ABNORMAL
BACTERIA # UR AUTO: ABNORMAL
BILIRUB UR-MCNC: NEGATIVE — SIGNIFICANT CHANGE UP
COLOR SPEC: YELLOW — SIGNIFICANT CHANGE UP
COMMENT - URINE: SIGNIFICANT CHANGE UP
DIFF PNL FLD: ABNORMAL
EPI CELLS # UR: ABNORMAL
GLUCOSE UR QL: NEGATIVE MG/DL — SIGNIFICANT CHANGE UP
KETONES UR-MCNC: NEGATIVE — SIGNIFICANT CHANGE UP
LEUKOCYTE ESTERASE UR-ACNC: NEGATIVE — SIGNIFICANT CHANGE UP
NITRITE UR-MCNC: NEGATIVE — SIGNIFICANT CHANGE UP
PH UR: 6 — SIGNIFICANT CHANGE UP (ref 5–8)
PROT UR-MCNC: 15 MG/DL
RBC CASTS # UR COMP ASSIST: SIGNIFICANT CHANGE UP /HPF (ref 0–4)
SP GR SPEC: 1.02 — SIGNIFICANT CHANGE UP (ref 1.01–1.02)
UROBILINOGEN FLD QL: NEGATIVE MG/DL — SIGNIFICANT CHANGE UP
WBC UR QL: SIGNIFICANT CHANGE UP

## 2023-08-27 PROCEDURE — G1004: CPT

## 2023-08-27 PROCEDURE — 74176 CT ABD & PELVIS W/O CONTRAST: CPT | Mod: 26,MF

## 2023-08-27 PROCEDURE — 99284 EMERGENCY DEPT VISIT MOD MDM: CPT

## 2023-08-27 RX ORDER — ASPIRIN/CALCIUM CARB/MAGNESIUM 324 MG
1 TABLET ORAL
Qty: 0 | Refills: 0 | DISCHARGE

## 2023-08-27 RX ORDER — FLUCONAZOLE 150 MG/1
1 TABLET ORAL
Qty: 1 | Refills: 0
Start: 2023-08-27 | End: 2023-08-27

## 2023-08-27 RX ORDER — ACETAMINOPHEN 500 MG
650 TABLET ORAL ONCE
Refills: 0 | Status: COMPLETED | OUTPATIENT
Start: 2023-08-27 | End: 2023-08-27

## 2023-08-27 RX ORDER — LIDOCAINE HCL 20 MG/ML
10 VIAL (ML) INJECTION ONCE
Refills: 0 | Status: DISCONTINUED | OUTPATIENT
Start: 2023-08-27 | End: 2023-08-27

## 2023-08-27 RX ORDER — ALBUTEROL 90 UG/1
3 AEROSOL, METERED ORAL
Qty: 0 | Refills: 0 | DISCHARGE

## 2023-08-27 RX ORDER — BENZOYL PEROXIDE 50 MG/ML
1 GEL TOPICAL ONCE
Refills: 0 | Status: DISCONTINUED | OUTPATIENT
Start: 2023-08-27 | End: 2023-08-27

## 2023-08-27 RX ORDER — OMEPRAZOLE 10 MG/1
1 CAPSULE, DELAYED RELEASE ORAL
Qty: 0 | Refills: 0 | DISCHARGE

## 2023-08-27 RX ORDER — LIDOCAINE 4 G/100G
1 CREAM TOPICAL ONCE
Refills: 0 | Status: COMPLETED | OUTPATIENT
Start: 2023-08-27 | End: 2023-08-27

## 2023-08-27 RX ORDER — FLUCONAZOLE 150 MG/1
150 TABLET ORAL ONCE
Refills: 0 | Status: COMPLETED | OUTPATIENT
Start: 2023-08-27 | End: 2023-08-27

## 2023-08-27 RX ADMIN — Medication 500 MILLIGRAM(S): at 04:32

## 2023-08-27 RX ADMIN — LIDOCAINE 1 APPLICATION(S): 4 CREAM TOPICAL at 03:37

## 2023-08-27 RX ADMIN — FLUCONAZOLE 150 MILLIGRAM(S): 150 TABLET ORAL at 04:32

## 2023-08-27 RX ADMIN — Medication 650 MILLIGRAM(S): at 04:32

## 2023-08-27 NOTE — ED ADULT NURSE NOTE - CAS TRG GENERAL AIRWAY, MLM
Have GI go over this with Dr. Obrien. I don't think a colonoscopy without a visit is appropriate due to his age and presence of bleeding.   Patent (4) excellent

## 2023-08-27 NOTE — ED ADULT NURSE NOTE - OBJECTIVE STATEMENT
as per pt she was having this pain for almost 2 years, h/o hysterectomy 1 year ago but pain is still there, saw a specialist was told she has hernia and did surgery for that. pt states she feels "burning, shaking inside, squeezing pain on the vagina". h/o asthma, Gerd.

## 2023-08-27 NOTE — ED PROVIDER NOTE - OBJECTIVE STATEMENT
57 yo F pmh hysterectomy, chroncic lower abdominal pain for which she has seen pmd and gyn and states now has acute on chronic pain with dysuria and vaginal burning. NO hx of std's. no vaginal discharge, bleeding, or lesions in vulva. Hx of uti. no known hx of yeast infxn.

## 2023-08-27 NOTE — ED PROVIDER NOTE - CARE PROVIDER_API CALL
Jake Lynch  Obstetrics and Gynecology  [address] [place] [address]  Phone: [phone]  Fax: [phone]  Follow Up Time: 4-6 Days

## 2023-08-27 NOTE — ED PROVIDER NOTE - PATIENT PORTAL LINK FT
You can access the FollowMyHealth Patient Portal offered by [facility] by registering at the following website: [email or web address]. By joining [employer]’s FollowMyHealth portal, you will also be able to view your health information using other applications (apps) compatible with our system.

## 2023-08-27 NOTE — ED PROVIDER NOTE - NSFOLLOWUPINSTRUCTIONS_ED_ALL_ED_FT
You were evaluated in the ER for vaginal burning and found to have a yeast infection. A dose of antifungal has been given to you. Take 2nd dose on Wednesday 8/30/23. Call number provided for a gynecology referral. Your urine study and CT scan show no acute abnormalities.

## 2023-08-27 NOTE — ED ADULT TRIAGE NOTE - CHIEF COMPLAINT QUOTE
lower abdominal pain x one year. was in [place] [facility] 4 days ago. lower abdominal pain x one year. was in [place] [facility] 4 days ago for same issue.

## 2023-08-27 NOTE — ED PROVIDER NOTE - CLINICAL SUMMARY MEDICAL DECISION MAKING FREE TEXT BOX
Pt with chronic vulva burning sensation without discharge or difficulty urinating x2 years. Hx hysterectomy. No n/v/d. Afebrile. exam benign. ua neg. Will treat for yeast infxn. Perhaps also due to vaginal atrophy in menopause. Will need to f/u with gyn. Requesting new gyn referral in [facility].

## 2023-08-27 NOTE — ED PROVIDER NOTE - PHYSICAL EXAMINATION
General: well appearing lying in stretcher comfortably  HEENT: NC/AT, MMM  Resp: no respiratory distress, tachypnea, or accessory muscle usage  Abd: soft, nd/nt no rebound or guarding, no cva tenderness  Skin: no rash  Neuro: Axox3, speaking full sentences, face symmetric, ambulatory

## 2023-08-27 NOTE — ED ADULT NURSE NOTE - NSFALLUNIVINTERV_ED_ALL_ED
Bed/Stretcher in lowest position, wheels locked, appropriate side rails in place/Call bell, personal items and telephone in reach/Instruct patient to call for assistance before getting out of bed/chair/stretcher/Non-slip footwear applied when patient is off stretcher/[place] to call system/Physically safe environment - no spills, clutter or unnecessary equipment/Purposeful proactive rounding/Room/bathroom lighting operational, light cord in reach

## 2023-08-28 LAB
CULTURE RESULTS: SIGNIFICANT CHANGE UP
SPECIMEN SOURCE: SIGNIFICANT CHANGE UP

## 2024-03-05 NOTE — ASU PREOP CHECKLIST - TO WHOM
[FreeTextEntry1] : In  the patient presented with exertional chest burning. Abnormal stress echocardiogram. Cardiac catheterization revealed nonobstructive coronary disease (only 40% distal LAD). He been maintained on Ranexa with good relief of his symptoms.  2016. The last couple weeks the patient has been getting his chest tightness, primarily at rest when he stressed. He's had to use nitroglycerin at least once a week. He's had no chest discomfort when he walks on the treadmill or exercise in the gym. Patient also feels slightly lightheaded once in a great while. He usually is sitting when it occurs. He does not have it when he is walking or standing. Stress echo on  was negative and the echo again showed mild to moderate MR, unchanged. 2017. One year since last visit. Symptoms, maybe once a week, and usually related to emotional upset and stress. Responds to nitroglycerin within 5 minutes. He never increased his Ranexa. He was complaining about the cost of Ranexa and we discussed the rationale for Ranexa versus other medications in his case. Beta blockers must be avoided given his sinus bradycardia. He had labs with Dr. Rossi on . He remains with iron deficiency anemia, but he had a negative GI workup in . Since then he has increased his iron from once a day to twice a day. His lipids had a cholesterol of 147, HDL 70, LDL 63, triglycerides 68, and he remains on atorvastatin. His hemoglobin A1c was 5.5, and the rest of his labs were unremarkable. He had a flu shot with Dr. Rossi. 2018. Patient has recently been noticing that he is getting more short of breath with exertion with some chest pressure. Can barely walk a block or 2. In addition he's had arthritis in his right knee that has been limiting him and causing some swelling in his legs. The orthopedist wants to give him a synthetic cartilage injection and he is seeing a vascular doctor later this week because of the swelling in his legs, which is a little more on the right side, where his knee is acting up. He said he walks and feels like an old man. EKG is sinus bradycardia and unchanged. Found to have severe iron deficiency anemia and referred to GI. 2018. The patient returns in followup. Had colonoscopy and endoscopy that were mostly negative. Still considering capsule study. In the meantime, he is on iron twice a day, and his levels have come up. He had been transfused 3 units at the beginning. He still has some orthostatic dizziness at times, but otherwise most of his symptoms are gone. He is getting ready for a trip to [facility]. His EKG remains with sinus bradycardia, and some nonspecific ST changes, but unchanged. 2018. Patient was at hematology on , and a heart rate of 42, was recorded. I was told he had a low heart rate and needed an appointment and he was scheduled for today. Now the patient says when he called he told them he was dizzy and having symptoms, which was not related to me. Here, he is in sinus bradycardia at 46, with mild ST depressions V4 through 6 as well as lead II and aVF. His dizziness is mostly when he changes positions that'll last for a few seconds, and then passes. He is not limited in his exercise capacity and does not quite get near syncopal. No other complaints. Able to walk 20 minutes the other day without stopping. 2019.  First visit in over a year.  Slightly depressed over diagnosis of Parkinson's but so far mild case and doing well.  On Azilect 0.5 twice daily and resagiline 1 mg daily.  Heart rate still slow at 41 but no syncope or near syncope.  Occasionally feels a little dizzy sitting or walking but does not sound like near syncope.  Changed his atorvastatin to every other day on his own but has not had lipids since October of last year with me.  He will have Dr. Rossi added to the blood test when he sees him in a few weeks.  Hemoglobin and hematocrit and iron levels have been holding steady.  No chest pain or shortness of breath.  Considering some experimental options for the Parkinson's going forward. 2019.  Patient called and wanted to come in.  Saw Dr. Sohail Arana of neurology who told him he thinks he needs a pacemaker and that his low heart rate could be explaining some of his Parkinson's symptoms.  I put in a call to Dr. Arana and I am awaiting a response.  In the meantime reviewed the symptoms and the procedure with the patient.  At times he says he does have some lightheadedness and dizziness and wants the pacemaker again at other times is too nervous does not want to stay in hospital overnight and does not think he is having any symptoms different than the last few years.  Certainly no syncope and no definite near syncope.  Heart rate here was 48.  Blood pressure okay.  No recent change in medications. 2020.  Patient here in follow-up.  Remains in sinus bradycardia at 47 with an otherwise normal ECG except incomplete right bundle branch block.  Recent labs with cholesterol 209, HDL 68, .  Normal thyroid function.  Last week he felt dizzy for most of the week and most of the days on and off  somewhat lightheaded but not near syncopal, not vertigo.  This week however he feels perfectly back to himself so not that likely to be from conduction disease.  No other complaints.  He remains on Lipitor and Ranexa. 2021.  Patient here for preop cardiac evaluation due to an abnormal ECG.  Last here over a year ago.  Has been doing well for the most part but has developed mild Parkinson's.  Not having any issues with angina currently but upon pushing the history he does have some orthostatic dizziness now and then.  Even to the point now where he knows to stop and wait a few minutes when he gets out of the car etc. (Actually I had mentioned this in my note over a year ago and that his symptoms were probably orthostasis related to his Parkinson's.)  Here in fact there is a drop in his blood pressure from lying to sitting and is probably related to his parkinsonism.  Otherwise no symptoms or signs of heart failure unstable angina etc.  He always has a mildly abnormal ECG with some ST depressions in the inferior leads and V4 through 6 and the EKG done preop at [facility] was not significantly changed from his previous EKGs of over a year ago.  He will be having surgery on both an inguinal and an abdominal hernia by Dr. Bo. September 10, 2021.  Patient here for urgent visit.  Called yesterday with a burning chest pain and I was unavailable and was told to speak with or see his internist Dr. Rossi.  He saw Dr. Rossi this morning and EKG was totally unchanged with his usual sinus bradycardia and minimal ST depressions.  He is now here. His surgery and the recovery was a little more difficult. And now for the last week or so he has been having his usual exertional chest burning relieved with rest. It does not seem to be progressive. No signs that he is anemic again but he is more fatigued since the surgery. He also talked about his mild orthostasis which again is more likely related to the Parkinson's. After long discussion we decided that if his troponin is positive but he is stable we will schedule him for an angiogram next week, and if troponin is negative we will try to double up on the Ranexa and consider a stress test or a CT angio of his coronaries. 2021. Spoke with patient.  Troponin negative.  Excellent lipid profile, normal hemoglobin A1c, normal BNP, sodium 131, hematocrit 37.  Patient actually feeling much better and would like to wait until the stress test scheduled at the beginning of October.  Only complaint is vague lightheadedness at times.  2021.  Patient returned for stress echo and follow-up.  He has been taking the Ranexa 1000 twice daily 1 day and 500 twice daily the next alternating for some reason of his own.  Has not really noticed any change in his symptoms.  On the stress test he got his symptoms at 4-1/2 minutes of a low level protocol with his heart rate 80 and there were ST depressions mostly in II and aVF but also in V3 through V6 that persisted in recovery.  He was able to do 10 minutes of the low level protocol to a heart rate of 111.  He got his usual chest burning that increased with exercise and took about 8 or 9 minutes of recovery to go away.  The echocardiogram post exercise was actually normal without wall motion abnormalities.  The resting echo itself had normal LV and RV function with mild to moderate MR and mild TR and was really unchanged from 2016.  After long discussion with patient and his wife we elected to schedule coronary angiography. 2021.Patient had cath yesterday.  70% stenosis in the mid LAD confirmed as significant with a positive IFR.  Stent was placed.  He should remain on dual antiplatelet therapy for 3 months.  I spoke with Dr. Man yesterday and I left a message for the patient this evening.  He should make follow-up appointment with me. 2021.  Patient returns in follow-up after his LAD stent as noted above.  EKG here is sinus at 45 with his usual mild ST depressions in leads II, V2 through 5. Feels wonderful since the stent but is also a little anxious ever since realizing that he needs a stent.  Still on Ranexa along with his statin and dual antiplatelet therapy and his Parkinson's medication.  Blood pressure running a little high today probably just from the stress.  Labs will be checked. 2021.  Patient here in follow-up.  Doing well from a cardiac point of view.  Another abdominal hernia popped up but he may need surgery again but I told him to ideally wait 6 months from the time of his stent.  He also needs injections in his knee and possibly hip and may need surgery in the future.  Good spirits with no other symptoms or complaints.  Still on dual antiplatelet therapy.  May be going away in January but a lot of stress with his mother-in-law and her Alzheimer's etc. 2022.  Semiurgent visit as patient just found out he is going to need hip replacement surgery.  Will probably be doing it with Dr. Nick Mackenzie at [facility].  Also discussed his hernia surgery with Dr. Bo to and most likely he will have the hernia surgery first.  For now he is willing to wait a little longer and stay on dual antiplatelet therapy.  No chest pain no shortness of breath no other new issues.  Reviewed the pros and cons of waiting 6 months versus waiting a little last in order to have the surgery. 2022.  Due to his abdominal discomfort he finally scheduled the hernia surgery from .  He has remained stable from a cardiac point of view.  Still on dual antiplatelet therapy.  No chest pain, shortness of breath, melena etc.  Baseline EKG still abnormal with some resting ST depressions but unchanged from previous tracings.  He thinks he will need hip replacement surgery pretty soon after that as well. May 13, 2022.  Patient returns after his hernia surgery of .  He remains in sinus bradycardia at 48 and the rest of his EKG is unchanged.  Had a rough time the first few weeks postop because of severe pain but now is doing well. 2022.  Patient came for urgent visit.  Has been feeling dizzy and lightheaded lately and call the office and could not get through so just came over.  Very difficult history to pin down but when trying to be more exact, never truly near syncopal, episodes last at least a minute but usually not more than 2 minutes.  Does occasionally say he just feels a little fuzzy but it is not chronic.  He does feel like taking a nap in the afternoon and has a little more generalized fatigue but that is a different symptom.  Here in the office he was in sinus bradycardia at only 44 but totally asymptomatic.  He is on Ranexa and he is on Parkinson's medications. 2022.  Patient called to say he is scheduled for hip replacement with Dr. Nick Mackenzie on .  He is having presurgical testing at [facility] later this week.  Based on his exam on , if his presurgical lab work is in order I see no contraindication to the upcoming surgery and anesthesia. 2022.  Patient here in follow-up.  Hip surgery went well without complication.  The home physical therapist stopped coming 2 weeks ago so for 2 weeks he did therapy on his own and now is starting to do outpatient physical therapy.  Still sore and slow to get around.  Still complains of dizziness which is mostly when he changes position and it just lasts for a few seconds or so.  Not orthostatic in the office, and when he is walking he does not have fatigue or shortness of breath or dizziness to blame on his low heart rate; as a matter fact his last stress test 1 year ago his resting heart rate was 45 but went up to 111 with a little exercise.  Here today he is resting ECG had a heart rate of 47 and during his vital signs his heart rate was 54.  He may end up needing a knee replacement in the future as well. 2022.  Patient here in follow-up because he mentions some swelling in his right leg ever since his right hip replacement and the doctor said go see a cardiologist.  No chest pain or shortness of breath.  Occasional lightheadedness now and then as he has had in the past.  Left leg has minimal pedal edema in the right leg is the one that had the hip replacement on.  No sudden onset of swelling redness tenderness etc. 2023.His neurologist Sohail thank called me that the patient is orthostatic with blood pressure dropping from 110-90.  Not really on any medications that should contribute to that.  I agree with the recommendation to start midodrine 5 mg twice a day.  2023.Received a note from Dr. Sohail Arana his neurologist based on the visit March 10, 2023 and then got a message that he will call me today to discuss the patient.  He has some orthostatic symptoms and his blood pressure dropped from 110-90 with standing up.  There is no recording of what his pulse was either supine or standing.  He is not on any cardiac medications that would affect his blood pressure.  This is most likely related to his Parkinson's even though in other areas his Parkinson's is under good control.  I agree that midodrine would be acceptable for the patient and I would avoid Florinef for now.  May 17, 2023.  Patient here for semiurgent visit complaining of dizziness.  Was already evaluated by Dr. Rsosi his internist who noted that the dizziness is clearly with changing positions and did find him to be orthostatic.  He has been on midodrine 5 mg.  He admits he is somewhat better since starting the midodrine which he takes at 12 noon and at 6 PM.  But he is still having episodes and I believe he is more anxious about them then disabled.  EKG here is sinus rhythm at 53 with a left anterior hemiblock and an incomplete right bundle branch block, nonspecific minimal ST changes, unchanged. 2023.  Patient returns in follow-up.  Last visit after speaking with Dr. Sohail Arana we went up to midodrine 10 mg twice a day.  Here now complaining of dizziness, complaining of other issues but mostly very depressed.  He had 2 friends who  of pancreatic cancer 1 just this past week.  Initially did not want to go up on his midodrine but now says he might be willing to.  Meanwhile he had labs with Dr. Rossi and his cholesterol was very high and he says now he is going to go back on the atorvastatin needing he stopped it on his own.  He complains he has too many pills that may make him nauseous etc.  No exertional chest pain or shortness of breath.  He even wanted to stop the Plavix and I told him he has to stay on either Plavix or aspirin and the Plavix is superior to aspirin.  No chest pain shortness of breath etc.  Mildly orthostatic as can be seen by the vital signs but not terrible but he does feel dizzy when he sits up. 2023.  Patient was having physical therapy on Friday,  and his  got a blood pressure in the 160s over 90s, stopped the exercise, made him lie down, called his wife, and insisted that he must see his cardiologist right away.  He called here and was told it was not an emergency and he could see me after the weekend and he comes in today.  He is in sinus rhythm at 49 with a left anterior hemiblock nonspecific ST changes incomplete right bundle branch block unchanged.  His blood pressure here was 127/81 supine, 116/76 sitting, 112/76 standing.  He still complains of dizziness which is mostly when he changes position and starts walking but otherwise is very stable.  Is now on midodrine 10 mg 3 times a day.  Not on any medications that would lower his blood pressure.  No cardiac symptoms or issues. 2023. Patient saw Sohail Arana his neurologist on . He still complains of significant orthostasis although in the office Dr. Arana could not elicit any orthostatic changes with the blood pressure being either 100/110. He is thinking about having me arrange a 24-hour blood pressure monitor but in the meantime he is on midodrine only 5 mg 3 times daily so I would certainly consider going up to 10 3 times daily.  2024.   Patient returns today.  EKG is sinus bradycardia at 48 with some nonspecific ST changes V3 through 6.  Incomplete right bundle branch block in V1.   No change from prior.   Initial blood pressure is adequate and his weight is actually up 4 pounds. Still complains of dizziness and fogginess is in his head and most of it is still orthostatic.  Again he is unsure if he is on 5 mg or 10 mg of midodrine; if he is on 10 3 times a day I would add Florinef.
OR JOHANNE Marinelli

## (undated) DEVICE — STAPLER COVIDIEN ENDO GIA STANDARD HANDLE

## (undated) DEVICE — SUT POLYSORB 4-0 27" P-12 UNDYED

## (undated) DEVICE — SUT SURGIPRO 1 30" GS-21

## (undated) DEVICE — PACK ROBOTIC LIJ

## (undated) DEVICE — Device

## (undated) DEVICE — ENDOCATCH 10MM SPECIMEN POUCH

## (undated) DEVICE — XI FORCEP PROGRASP 8MM

## (undated) DEVICE — SUT BIOSYN 4-0 18" P-12

## (undated) DEVICE — GOWN XL

## (undated) DEVICE — CONTAINER SPECIMEN 100ML

## (undated) DEVICE — DRSG MASTISOL

## (undated) DEVICE — DRAPE LIGHT HANDLE COVER BLUE

## (undated) DEVICE — FRA-ESU BOVIE FORCE TRIAD T7J19717DX: Type: DURABLE MEDICAL EQUIPMENT

## (undated) DEVICE — NEEDLE COUNTER FOAM AND MAGNET 10-20

## (undated) DEVICE — XI DRAPE ARM

## (undated) DEVICE — DRSG 4X4

## (undated) DEVICE — FOR-ESU VALLEYLAB T7E14982DX: Type: DURABLE MEDICAL EQUIPMENT

## (undated) DEVICE — SYR ASEPTO

## (undated) DEVICE — UTERINE MANIPULATOR COOPER SURGICAL 5MM 33CM GREEN

## (undated) DEVICE — TROCAR SURGIQUEST AIRSEAL 5MMX100MM

## (undated) DEVICE — POSITIONER FOAM HEAD CRADLE

## (undated) DEVICE — PREP CHLORAPREP ORANGE 2PCT 26ML

## (undated) DEVICE — PACK MINOR NO DRAPE

## (undated) DEVICE — TUBING STRYKEFLOW II SUCTION / IRRIGATOR

## (undated) DEVICE — SUT POLYSORB 0 30" GS-22 UNDYED

## (undated) DEVICE — BLANKET WARMER UPPER ADULT

## (undated) DEVICE — DRSG TEGADERM 4X4.75"

## (undated) DEVICE — SUT POLYSORB 2-0 18" TIES UNDYED

## (undated) DEVICE — ELCTR GROUNDING PAD ADULT COVIDIEN

## (undated) DEVICE — XI FORCEP FENESTRATED BIPOLAR 8MM

## (undated) DEVICE — TROCAR COVIDIEN VERSAPORT BLADELESS OPTICAL 5MM STANDARD

## (undated) DEVICE — GLV 7 PROTEXIS

## (undated) DEVICE — DRAPE IOBAN 13X13"

## (undated) DEVICE — SOL IRR POUR NS 0.9% 1500ML

## (undated) DEVICE — UTERINE MANIPULATOR CONMED VCARE SM 32MM

## (undated) DEVICE — SYR LUER LOK 50CC

## (undated) DEVICE — UTERINE MANIPULATOR CONMED VCARE LG 37MM

## (undated) DEVICE — GLV 6.5 PROTEXIS

## (undated) DEVICE — XI SEALER DA VINCI VESSEL

## (undated) DEVICE — DRSG STERISTRIPS 0.5X4"

## (undated) DEVICE — SUT POLYSORB 3-0 30" V-20 UNDYED

## (undated) DEVICE — COVER TIP INTUITIVE W INSERTION TOOL

## (undated) DEVICE — PROTECTOR ONE STEP TRENDELENBERG ARM

## (undated) DEVICE — FOLEY TRAY 16FR 5CC LTX UMETER CLOSED

## (undated) DEVICE — NDL HYPO REGULAR BEVEL 25G X 1.5"

## (undated) DEVICE — XI SCISSOR MONO CURVED

## (undated) DEVICE — XI DRAPE COLUMN

## (undated) DEVICE — TUBE TRI-LUMEN FILT USE W AIRSEAL

## (undated) DEVICE — DRAPE LAPAROTOMY W POUCHES

## (undated) DEVICE — WRAP COMPRESSION CALF MED

## (undated) DEVICE — POSITIONER PINK PAD PIGAZZI SYSTEM

## (undated) DEVICE — SOL IRR POUR NS 0.9% 500ML

## (undated) DEVICE — XI SEAL UNIV 5- 8 MM

## (undated) DEVICE — UTERINE MANIPULATOR CONMED VCARE MED 34MM

## (undated) DEVICE — APPLICATOR EVICEL 45CM FLEXIBLE TIP

## (undated) DEVICE — GLV 6 ESTEEM BLUE

## (undated) DEVICE — LUBE JELLY FOILPACK 36GM STERILE

## (undated) DEVICE — XI OBTURATOR OPTICAL BLADELESS 8MM

## (undated) DEVICE — D HELP - CLEARVIEW CLEARIFY SYSTEM

## (undated) DEVICE — SYR LUER LOK 10CC

## (undated) DEVICE — BAG DRAINAGE URINARY 2L

## (undated) DEVICE — SOL IRR POUR H2O 250ML

## (undated) DEVICE — TROCAR APPLIED MEDICAL KII BALLOON BLUNT TIP 12MM X 100MM

## (undated) DEVICE — GLV 7.5 PROTEXIS

## (undated) DEVICE — BLANKET WARMER LOWER ADULT

## (undated) DEVICE — SUT POLYSORB 2-0 30" V-20

## (undated) DEVICE — GLV 6 PROTEXIS